# Patient Record
Sex: FEMALE | Race: WHITE | NOT HISPANIC OR LATINO | Employment: FULL TIME | URBAN - METROPOLITAN AREA
[De-identification: names, ages, dates, MRNs, and addresses within clinical notes are randomized per-mention and may not be internally consistent; named-entity substitution may affect disease eponyms.]

---

## 2018-03-23 ENCOUNTER — HISTORICAL (OUTPATIENT)
Dept: LAB | Facility: HOSPITAL | Age: 58
End: 2018-03-23

## 2018-03-23 LAB
ABS NEUT (OLG): 2.77 X10(3)/MCL (ref 2.1–9.2)
BASOPHILS # BLD AUTO: 0.1 X10(3)/MCL (ref 0–0.2)
BASOPHILS NFR BLD AUTO: 1 %
CRP SERPL HS-MCNC: 0.43 MG/L (ref 0–3)
EOSINOPHIL # BLD AUTO: 0.2 X10(3)/MCL (ref 0–0.9)
EOSINOPHIL NFR BLD AUTO: 3 %
ERYTHROCYTE [DISTWIDTH] IN BLOOD BY AUTOMATED COUNT: 13.2 % (ref 11.5–17)
ERYTHROCYTE [SEDIMENTATION RATE] IN BLOOD: 12 MM/HR (ref 0–20)
HCT VFR BLD AUTO: 42.5 % (ref 37–47)
HGB BLD-MCNC: 12.9 GM/DL (ref 12–16)
LYMPHOCYTES # BLD AUTO: 1.7 X10(3)/MCL (ref 0.6–4.6)
LYMPHOCYTES NFR BLD AUTO: 34 %
MCH RBC QN AUTO: 28.5 PG (ref 27–31)
MCHC RBC AUTO-ENTMCNC: 30.4 GM/DL (ref 33–36)
MCV RBC AUTO: 94 FL (ref 80–94)
MONOCYTES # BLD AUTO: 0.3 X10(3)/MCL (ref 0.1–1.3)
MONOCYTES NFR BLD AUTO: 6 %
NEUTROPHILS # BLD AUTO: 2.77 X10(3)/MCL (ref 1.4–7.9)
NEUTROPHILS NFR BLD AUTO: 55 %
PLATELET # BLD AUTO: 194 X10(3)/MCL (ref 130–400)
PMV BLD AUTO: 12.6 FL (ref 9.4–12.4)
RBC # BLD AUTO: 4.52 X10(6)/MCL (ref 4.2–5.4)
RHEUMATOID FACT SERPL-ACNC: <10 IU/ML (ref 0–15)
URATE SERPL-MCNC: 4.6 MG/DL (ref 2.6–7.2)
WBC # SPEC AUTO: 5 X10(3)/MCL (ref 4.5–11.5)

## 2018-06-04 LAB
BUN SERPL-MCNC: 27 MG/DL (ref 7–18)
CREAT SERPL-MCNC: 1.06 MG/DL (ref 0.6–1.3)
GLUCOSE SERPL-MCNC: 105 MG/DL (ref 74–106)

## 2018-12-12 ENCOUNTER — HISTORICAL (OUTPATIENT)
Dept: LAB | Facility: HOSPITAL | Age: 58
End: 2018-12-12

## 2018-12-12 LAB
CHOLEST SERPL-MCNC: 259 MG/DL (ref 0–200)
CHOLEST/HDLC SERPL: 2.5 {RATIO} (ref 0–4)
HDLC SERPL-MCNC: 102 MG/DL (ref 35–60)
LDLC SERPL CALC-MCNC: 143 MG/DL (ref 0–129)
TRIGL SERPL-MCNC: 68 MG/DL (ref 30–150)
VLDLC SERPL CALC-MCNC: 14 MG/DL

## 2019-01-09 ENCOUNTER — HISTORICAL (OUTPATIENT)
Dept: LAB | Facility: HOSPITAL | Age: 59
End: 2019-01-09

## 2019-01-09 LAB
ABS NEUT (OLG): 2.97 X10(3)/MCL (ref 2.1–9.2)
ALBUMIN SERPL-MCNC: 3.8 GM/DL (ref 3.4–5)
ALBUMIN/GLOB SERPL: 1 {RATIO}
ALP SERPL-CCNC: 58 UNIT/L (ref 38–126)
ALT SERPL-CCNC: 52 UNIT/L (ref 12–78)
APPEARANCE, UA: CLEAR
AST SERPL-CCNC: 31 UNIT/L (ref 15–37)
BACTERIA SPEC CULT: ABNORMAL /HPF
BASOPHILS # BLD AUTO: 0 X10(3)/MCL (ref 0–0.2)
BASOPHILS NFR BLD AUTO: 1 %
BILIRUB SERPL-MCNC: 0.3 MG/DL (ref 0.2–1)
BILIRUB UR QL STRIP: NEGATIVE
BILIRUBIN DIRECT+TOT PNL SERPL-MCNC: 0.1 MG/DL (ref 0–0.2)
BILIRUBIN DIRECT+TOT PNL SERPL-MCNC: 0.2 MG/DL (ref 0–0.8)
BUN SERPL-MCNC: 27 MG/DL (ref 7–18)
CALCIUM SERPL-MCNC: 9.2 MG/DL (ref 8.5–10.1)
CHLORIDE SERPL-SCNC: 101 MMOL/L (ref 98–107)
CHOLEST SERPL-MCNC: 231 MG/DL (ref 0–200)
CHOLEST/HDLC SERPL: 2.5 {RATIO} (ref 0–4)
CO2 SERPL-SCNC: 30 MMOL/L (ref 21–32)
COLOR UR: YELLOW
CREAT SERPL-MCNC: 0.87 MG/DL (ref 0.55–1.02)
DEPRECATED CALCIDIOL+CALCIFEROL SERPL-MC: 64.6 NG/ML (ref 30–80)
EOSINOPHIL # BLD AUTO: 0.2 X10(3)/MCL (ref 0–0.9)
EOSINOPHIL NFR BLD AUTO: 3 %
ERYTHROCYTE [DISTWIDTH] IN BLOOD BY AUTOMATED COUNT: 12.9 % (ref 11.5–17)
GLOBULIN SER-MCNC: 3.8 GM/DL (ref 2.4–3.5)
GLUCOSE (UA): NEGATIVE
GLUCOSE SERPL-MCNC: 94 MG/DL (ref 74–106)
HCT VFR BLD AUTO: 44.6 % (ref 37–47)
HDLC SERPL-MCNC: 93 MG/DL (ref 35–60)
HGB BLD-MCNC: 13.4 GM/DL (ref 12–16)
HGB UR QL STRIP: ABNORMAL
KETONES UR QL STRIP: NEGATIVE
LDLC SERPL CALC-MCNC: 126 MG/DL (ref 0–129)
LEUKOCYTE ESTERASE UR QL STRIP: ABNORMAL
LYMPHOCYTES # BLD AUTO: 1.4 X10(3)/MCL (ref 0.6–4.6)
LYMPHOCYTES NFR BLD AUTO: 29 %
MCH RBC QN AUTO: 28.3 PG (ref 27–31)
MCHC RBC AUTO-ENTMCNC: 30 GM/DL (ref 33–36)
MCV RBC AUTO: 94.3 FL (ref 80–94)
MONOCYTES # BLD AUTO: 0.3 X10(3)/MCL (ref 0.1–1.3)
MONOCYTES NFR BLD AUTO: 6 %
NEUTROPHILS # BLD AUTO: 2.97 X10(3)/MCL (ref 2.1–9.2)
NEUTROPHILS NFR BLD AUTO: 60 %
NITRITE UR QL STRIP: NEGATIVE
PH UR STRIP: 5.5 [PH] (ref 5–9)
PLATELET # BLD AUTO: 196 X10(3)/MCL (ref 130–400)
PMV BLD AUTO: 13.4 FL (ref 9.4–12.4)
POTASSIUM SERPL-SCNC: 4.3 MMOL/L (ref 3.5–5.1)
PROT SERPL-MCNC: 7.6 GM/DL (ref 6.4–8.2)
PROT UR QL STRIP: NEGATIVE
RBC # BLD AUTO: 4.73 X10(6)/MCL (ref 4.2–5.4)
RBC #/AREA URNS HPF: ABNORMAL /[HPF]
SODIUM SERPL-SCNC: 139 MMOL/L (ref 136–145)
SP GR UR STRIP: 1.02 (ref 1–1.03)
SQUAMOUS EPITHELIAL, UA: ABNORMAL
TRIGL SERPL-MCNC: 62 MG/DL (ref 30–150)
TSH SERPL-ACNC: 3.33 MIU/L (ref 0.36–3.74)
UROBILINOGEN UR STRIP-ACNC: 0.2
VLDLC SERPL CALC-MCNC: 12 MG/DL
WBC # SPEC AUTO: 4.9 X10(3)/MCL (ref 4.5–11.5)
WBC #/AREA URNS HPF: ABNORMAL /[HPF]

## 2019-09-30 ENCOUNTER — HISTORICAL (OUTPATIENT)
Dept: LAB | Facility: HOSPITAL | Age: 59
End: 2019-09-30

## 2019-09-30 LAB
ABS NEUT (OLG): 2.85 X10(3)/MCL (ref 2.1–9.2)
ALBUMIN SERPL-MCNC: 4.2 GM/DL (ref 3.4–5)
ALBUMIN/GLOB SERPL: 1.2 RATIO (ref 1.1–2)
ALP SERPL-CCNC: 59 UNIT/L (ref 38–126)
ALT SERPL-CCNC: 45 UNIT/L (ref 12–78)
AST SERPL-CCNC: 25 UNIT/L (ref 15–37)
BASOPHILS # BLD AUTO: 0.1 X10(3)/MCL (ref 0–0.2)
BASOPHILS NFR BLD AUTO: 1 %
BILIRUB SERPL-MCNC: 0.4 MG/DL (ref 0.2–1)
BILIRUBIN DIRECT+TOT PNL SERPL-MCNC: 0.1 MG/DL (ref 0–0.5)
BILIRUBIN DIRECT+TOT PNL SERPL-MCNC: 0.3 MG/DL (ref 0–0.8)
BUN SERPL-MCNC: 27 MG/DL (ref 7–18)
CALCIUM SERPL-MCNC: 9.3 MG/DL (ref 8.5–10.1)
CHLORIDE SERPL-SCNC: 105 MMOL/L (ref 98–107)
CHOLEST SERPL-MCNC: 237 MG/DL (ref 0–200)
CHOLEST/HDLC SERPL: 2.5 {RATIO} (ref 0–4)
CO2 SERPL-SCNC: 32 MMOL/L (ref 21–32)
CREAT SERPL-MCNC: 0.86 MG/DL (ref 0.55–1.02)
EOSINOPHIL # BLD AUTO: 0.2 X10(3)/MCL (ref 0–0.9)
EOSINOPHIL NFR BLD AUTO: 4 %
ERYTHROCYTE [DISTWIDTH] IN BLOOD BY AUTOMATED COUNT: 13.4 % (ref 11.5–17)
GLOBULIN SER-MCNC: 3.6 GM/DL (ref 2.4–3.5)
GLUCOSE SERPL-MCNC: 101 MG/DL (ref 74–106)
HCT VFR BLD AUTO: 43.4 % (ref 37–47)
HDLC SERPL-MCNC: 94 MG/DL (ref 35–60)
HGB BLD-MCNC: 13.3 GM/DL (ref 12–16)
LDLC SERPL CALC-MCNC: 131 MG/DL (ref 0–129)
LYMPHOCYTES # BLD AUTO: 1.5 X10(3)/MCL (ref 0.6–4.6)
LYMPHOCYTES NFR BLD AUTO: 30 %
MCH RBC QN AUTO: 28.7 PG (ref 27–31)
MCHC RBC AUTO-ENTMCNC: 30.6 GM/DL (ref 33–36)
MCV RBC AUTO: 93.7 FL (ref 80–94)
MONOCYTES # BLD AUTO: 0.4 X10(3)/MCL (ref 0.1–1.3)
MONOCYTES NFR BLD AUTO: 7 %
NEUTROPHILS # BLD AUTO: 2.85 X10(3)/MCL (ref 2.1–9.2)
NEUTROPHILS NFR BLD AUTO: 57 %
PLATELET # BLD AUTO: 154 X10(3)/MCL (ref 130–400)
PMV BLD AUTO: 14.1 FL (ref 9.4–12.4)
POTASSIUM SERPL-SCNC: 4.5 MMOL/L (ref 3.5–5.1)
PROT SERPL-MCNC: 7.8 GM/DL (ref 6.4–8.2)
RBC # BLD AUTO: 4.63 X10(6)/MCL (ref 4.2–5.4)
SODIUM SERPL-SCNC: 142 MMOL/L (ref 136–145)
TRIGL SERPL-MCNC: 59 MG/DL (ref 30–150)
TSH SERPL-ACNC: 4.19 MIU/L (ref 0.36–3.74)
VLDLC SERPL CALC-MCNC: 12 MG/DL
WBC # SPEC AUTO: 5 X10(3)/MCL (ref 4.5–11.5)

## 2020-02-07 ENCOUNTER — HISTORICAL (OUTPATIENT)
Dept: RADIOLOGY | Facility: HOSPITAL | Age: 60
End: 2020-02-07

## 2020-02-07 LAB
BUN SERPL-MCNC: 38 MG/DL (ref 7–18)
CALCIUM SERPL-MCNC: 9.9 MG/DL (ref 8.5–10.1)
CHLORIDE SERPL-SCNC: 102 MMOL/L (ref 98–107)
CO2 SERPL-SCNC: 32 MMOL/L (ref 21–32)
CREAT SERPL-MCNC: 1 MG/DL (ref 0.6–1.3)
CREAT/UREA NIT SERPL: 38
CRP SERPL-MCNC: <0.3 MG/DL
ERYTHROCYTE [SEDIMENTATION RATE] IN BLOOD: 20 MM/HR (ref 0–20)
GLUCOSE SERPL-MCNC: 102 MG/DL (ref 74–106)
POTASSIUM SERPL-SCNC: 4.8 MMOL/L (ref 3.5–5.1)
SODIUM SERPL-SCNC: 137 MMOL/L (ref 136–145)
T PALLIDUM AB SER QL: NONREACTIVE

## 2020-03-27 ENCOUNTER — HISTORICAL (OUTPATIENT)
Dept: ADMINISTRATIVE | Facility: HOSPITAL | Age: 60
End: 2020-03-27

## 2020-03-27 LAB
ABS NEUT (OLG): 4.53 X10(3)/MCL (ref 2.1–9.2)
ALBUMIN SERPL-MCNC: 3.9 GM/DL (ref 3.4–5)
ALBUMIN/GLOB SERPL: 1 RATIO (ref 1.1–2)
ALP SERPL-CCNC: 61 UNIT/L (ref 45–117)
ALT SERPL-CCNC: 28 UNIT/L (ref 12–78)
AST SERPL-CCNC: 21 UNIT/L (ref 15–37)
BASOPHILS # BLD AUTO: 0 X10(3)/MCL (ref 0–0.2)
BASOPHILS NFR BLD AUTO: 1 %
BILIRUB SERPL-MCNC: 0.3 MG/DL (ref 0.2–1)
BILIRUBIN DIRECT+TOT PNL SERPL-MCNC: 0.1 MG/DL (ref 0–0.2)
BILIRUBIN DIRECT+TOT PNL SERPL-MCNC: 0.2 MG/DL
BUN SERPL-MCNC: 30 MG/DL (ref 7–18)
CALCIUM SERPL-MCNC: 9.5 MG/DL (ref 8.5–10.1)
CHLORIDE SERPL-SCNC: 105 MMOL/L (ref 98–107)
CHOLEST SERPL-MCNC: 224 MG/DL
CHOLEST/HDLC SERPL: 2.5 {RATIO} (ref 0–4.4)
CO2 SERPL-SCNC: 30 MMOL/L (ref 21–32)
CREAT SERPL-MCNC: 0.9 MG/DL (ref 0.6–1.3)
DEPRECATED CALCIDIOL+CALCIFEROL SERPL-MC: 36.1 NG/ML (ref 30–80)
EOSINOPHIL # BLD AUTO: 0.2 X10(3)/MCL (ref 0–0.9)
EOSINOPHIL NFR BLD AUTO: 2 %
ERYTHROCYTE [DISTWIDTH] IN BLOOD BY AUTOMATED COUNT: 12.9 % (ref 11.5–14.5)
FOLATE SERPL-MCNC: 15.3 NG/ML (ref 3.1–17.5)
GLOBULIN SER-MCNC: 4.1 GM/ML (ref 2.3–3.5)
GLUCOSE SERPL-MCNC: 103 MG/DL (ref 74–106)
HCT VFR BLD AUTO: 40.9 % (ref 35–46)
HDLC SERPL-MCNC: 88 MG/DL (ref 40–59)
HGB BLD-MCNC: 12.9 GM/DL (ref 12–16)
IMM GRANULOCYTES # BLD AUTO: 0.02 10*3/UL
IMM GRANULOCYTES NFR BLD AUTO: 0 %
LDLC SERPL CALC-MCNC: 123 MG/DL
LYMPHOCYTES # BLD AUTO: 1.4 X10(3)/MCL (ref 0.6–4.6)
LYMPHOCYTES NFR BLD AUTO: 21 %
MCH RBC QN AUTO: 28.6 PG (ref 26–34)
MCHC RBC AUTO-ENTMCNC: 31.5 GM/DL (ref 31–37)
MCV RBC AUTO: 90.7 FL (ref 80–100)
MONOCYTES # BLD AUTO: 0.4 X10(3)/MCL (ref 0.1–1.3)
MONOCYTES NFR BLD AUTO: 7 %
NEUTROPHILS # BLD AUTO: 4.53 X10(3)/MCL (ref 2.1–9.2)
NEUTROPHILS NFR BLD AUTO: 69 %
PLATELET # BLD AUTO: 174 X10(3)/MCL (ref 130–400)
PMV BLD AUTO: 12.5 FL (ref 7.4–10.4)
POTASSIUM SERPL-SCNC: 4.8 MMOL/L (ref 3.5–5.1)
PROT SERPL-MCNC: 8 GM/DL (ref 6.4–8.2)
RBC # BLD AUTO: 4.51 X10(6)/MCL (ref 4–5.2)
SODIUM SERPL-SCNC: 140 MMOL/L (ref 136–145)
T3FREE SERPL-MCNC: 2.31 PG/ML (ref 2.18–3.98)
T4 FREE SERPL-MCNC: 1.21 NG/DL (ref 0.76–1.46)
TRIGL SERPL-MCNC: 64 MG/DL
TSH SERPL-ACNC: 2.31 MIU/L (ref 0.36–3.74)
VIT B12 SERPL-MCNC: 291 PG/ML (ref 193–986)
VLDLC SERPL CALC-MCNC: 13 MG/DL
WBC # SPEC AUTO: 6.6 X10(3)/MCL (ref 4.5–11)

## 2020-05-15 ENCOUNTER — HISTORICAL (OUTPATIENT)
Dept: LAB | Facility: HOSPITAL | Age: 60
End: 2020-05-15

## 2020-05-15 LAB
T4 FREE SERPL-MCNC: 1.22 NG/DL (ref 0.76–1.46)
TSH SERPL-ACNC: 1.96 MIU/L (ref 0.36–3.74)
VIT B12 SERPL-MCNC: 912 PG/ML (ref 193–986)

## 2021-01-27 ENCOUNTER — HISTORICAL (OUTPATIENT)
Dept: LAB | Facility: HOSPITAL | Age: 61
End: 2021-01-27

## 2021-01-27 LAB
ABS NEUT (OLG): 2.45 X10(3)/MCL (ref 2.1–9.2)
ALBUMIN SERPL-MCNC: 4.1 GM/DL (ref 3.5–5)
ALBUMIN/GLOB SERPL: 1.1 RATIO (ref 1.1–2)
ALP SERPL-CCNC: 63 UNIT/L (ref 40–150)
ALT SERPL-CCNC: 37 UNIT/L (ref 0–55)
AST SERPL-CCNC: 24 UNIT/L (ref 5–34)
BASOPHILS # BLD AUTO: 0.1 X10(3)/MCL (ref 0–0.2)
BASOPHILS NFR BLD AUTO: 1 %
BILIRUB SERPL-MCNC: <0.5 MG/DL
BILIRUBIN DIRECT+TOT PNL SERPL-MCNC: 0.2 MG/DL (ref 0–0.5)
BILIRUBIN DIRECT+TOT PNL SERPL-MCNC: <0.3 MG/DL (ref 0–0.8)
BUN SERPL-MCNC: 28 MG/DL (ref 9.8–20.1)
CALCIUM SERPL-MCNC: 10.2 MG/DL (ref 8.4–10.2)
CHLORIDE SERPL-SCNC: 100 MMOL/L (ref 98–107)
CO2 SERPL-SCNC: 32 MMOL/L (ref 22–29)
CREAT SERPL-MCNC: 0.84 MG/DL (ref 0.55–1.02)
EOSINOPHIL # BLD AUTO: 0.2 X10(3)/MCL (ref 0–0.9)
EOSINOPHIL NFR BLD AUTO: 4 %
ERYTHROCYTE [DISTWIDTH] IN BLOOD BY AUTOMATED COUNT: 12.9 % (ref 11.5–14.5)
ERYTHROCYTE [SEDIMENTATION RATE] IN BLOOD: 18 MM/HR (ref 0–20)
GLOBULIN SER-MCNC: 3.6 GM/DL (ref 2.4–3.5)
GLUCOSE SERPL-MCNC: 85 MG/DL (ref 74–100)
HCT VFR BLD AUTO: 40.4 % (ref 35–46)
HGB BLD-MCNC: 13 GM/DL (ref 12–16)
IMM GRANULOCYTES # BLD AUTO: 0.02 10*3/UL
IMM GRANULOCYTES NFR BLD AUTO: 0 %
LYMPHOCYTES # BLD AUTO: 1.9 X10(3)/MCL (ref 0.6–4.6)
LYMPHOCYTES NFR BLD AUTO: 38 %
MCH RBC QN AUTO: 28.8 PG (ref 26–34)
MCHC RBC AUTO-ENTMCNC: 32.2 GM/DL (ref 31–37)
MCV RBC AUTO: 89.4 FL (ref 80–100)
MONOCYTES # BLD AUTO: 0.3 X10(3)/MCL (ref 0.1–1.3)
MONOCYTES NFR BLD AUTO: 6 %
NEUTROPHILS # BLD AUTO: 2.45 X10(3)/MCL (ref 2.1–9.2)
NEUTROPHILS NFR BLD AUTO: 49 %
PLATELET # BLD AUTO: 196 X10(3)/MCL (ref 130–400)
PMV BLD AUTO: 12.9 FL (ref 7.4–10.4)
POTASSIUM SERPL-SCNC: 3.8 MMOL/L (ref 3.5–5.1)
PROT SERPL-MCNC: 7.7 GM/DL (ref 6.4–8.3)
RBC # BLD AUTO: 4.52 X10(6)/MCL (ref 4–5.2)
SODIUM SERPL-SCNC: 142 MMOL/L (ref 136–145)
TSH SERPL-ACNC: 1.49 UIU/ML (ref 0.35–4.94)
WBC # SPEC AUTO: 5 X10(3)/MCL (ref 4.5–11)

## 2021-02-04 ENCOUNTER — HISTORICAL (OUTPATIENT)
Dept: ADMINISTRATIVE | Facility: HOSPITAL | Age: 61
End: 2021-02-04

## 2021-02-04 LAB
ABS NEUT (OLG): 3.83 X10(3)/MCL (ref 2.1–9.2)
ALBUMIN SERPL-MCNC: 4 GM/DL (ref 3.5–5)
ALBUMIN/GLOB SERPL: 1.5 RATIO (ref 1.1–2)
ALP SERPL-CCNC: 64 UNIT/L (ref 40–150)
ALT SERPL-CCNC: 27 UNIT/L (ref 0–55)
AST SERPL-CCNC: 18 UNIT/L (ref 5–34)
BASOPHILS # BLD AUTO: 0 X10(3)/MCL (ref 0–0.2)
BASOPHILS NFR BLD AUTO: 1 %
BILIRUB SERPL-MCNC: 0.2 MG/DL
BILIRUBIN DIRECT+TOT PNL SERPL-MCNC: 0.1 MG/DL (ref 0–0.5)
BILIRUBIN DIRECT+TOT PNL SERPL-MCNC: 0.1 MG/DL (ref 0–0.8)
BUN SERPL-MCNC: 25.8 MG/DL (ref 9.8–20.1)
CALCIUM SERPL-MCNC: 9 MG/DL (ref 8.4–10.2)
CHLORIDE SERPL-SCNC: 102 MMOL/L (ref 98–107)
CHOLEST SERPL-MCNC: 222 MG/DL
CHOLEST/HDLC SERPL: 3 {RATIO} (ref 0–5)
CO2 SERPL-SCNC: 30 MMOL/L (ref 22–29)
CREAT SERPL-MCNC: 0.93 MG/DL (ref 0.55–1.02)
DEPRECATED CALCIDIOL+CALCIFEROL SERPL-MC: 39.7 NG/ML (ref 30–80)
EOSINOPHIL # BLD AUTO: 0.4 X10(3)/MCL (ref 0–0.9)
EOSINOPHIL NFR BLD AUTO: 6 %
ERYTHROCYTE [DISTWIDTH] IN BLOOD BY AUTOMATED COUNT: 13.9 % (ref 11.5–17)
GLOBULIN SER-MCNC: 2.6 GM/DL (ref 2.4–3.5)
GLUCOSE SERPL-MCNC: 94 MG/DL (ref 74–100)
HCT VFR BLD AUTO: 43 % (ref 37–47)
HDLC SERPL-MCNC: 73 MG/DL (ref 35–60)
HGB BLD-MCNC: 12.9 GM/DL (ref 12–16)
LDLC SERPL CALC-MCNC: 136 MG/DL (ref 50–140)
LYMPHOCYTES # BLD AUTO: 1.3 X10(3)/MCL (ref 0.6–4.6)
LYMPHOCYTES NFR BLD AUTO: 22 %
MCH RBC QN AUTO: 28.7 PG (ref 27–31)
MCHC RBC AUTO-ENTMCNC: 30 GM/DL (ref 33–36)
MCV RBC AUTO: 95.8 FL (ref 80–94)
MONOCYTES # BLD AUTO: 0.4 X10(3)/MCL (ref 0.1–1.3)
MONOCYTES NFR BLD AUTO: 7 %
NEUTROPHILS # BLD AUTO: 3.83 X10(3)/MCL (ref 2.1–9.2)
NEUTROPHILS NFR BLD AUTO: 64 %
PLATELET # BLD AUTO: 198 X10(3)/MCL (ref 130–400)
PMV BLD AUTO: 13.2 FL (ref 9.4–12.4)
POTASSIUM SERPL-SCNC: 4.7 MMOL/L (ref 3.5–5.1)
PROT SERPL-MCNC: 6.6 GM/DL (ref 6.4–8.3)
RBC # BLD AUTO: 4.49 X10(6)/MCL (ref 4.2–5.4)
SODIUM SERPL-SCNC: 143 MMOL/L (ref 136–145)
TRIGL SERPL-MCNC: 65 MG/DL (ref 37–140)
TSH SERPL-ACNC: 2.8 UIU/ML (ref 0.35–4.94)
VLDLC SERPL CALC-MCNC: 13 MG/DL
WBC # SPEC AUTO: 6 X10(3)/MCL (ref 4.5–11.5)

## 2021-02-11 LAB
HUMAN PAPILLOMAVIRUS (HPV): NORMAL
PAP RECOMMENDATION EXT: NORMAL
PAP SMEAR: NORMAL

## 2021-06-22 ENCOUNTER — HISTORICAL (OUTPATIENT)
Dept: ADMINISTRATIVE | Facility: HOSPITAL | Age: 61
End: 2021-06-22

## 2021-06-22 LAB — VIT B12 SERPL-MCNC: 533 PG/ML (ref 213–816)

## 2021-06-29 LAB — BCS RECOMMENDATION EXT: NORMAL

## 2021-07-19 ENCOUNTER — HISTORICAL (OUTPATIENT)
Dept: ADMINISTRATIVE | Facility: HOSPITAL | Age: 61
End: 2021-07-19

## 2021-09-23 ENCOUNTER — HISTORICAL (OUTPATIENT)
Dept: LAB | Facility: HOSPITAL | Age: 61
End: 2021-09-23

## 2021-09-23 LAB
C3 SERPL-MCNC: 111 MG/DL (ref 80–173)
C4 SERPL-MCNC: 38.6 MG/DL (ref 13–46)

## 2021-11-05 ENCOUNTER — HISTORICAL (OUTPATIENT)
Dept: LAB | Facility: HOSPITAL | Age: 61
End: 2021-11-05

## 2021-11-05 LAB
CHOLEST SERPL-MCNC: 207 MG/DL
CHOLEST/HDLC SERPL: 3 {RATIO} (ref 0–5)
HDLC SERPL-MCNC: 71 MG/DL (ref 35–60)
LDLC SERPL CALC-MCNC: 127 MG/DL (ref 50–140)
TRIGL SERPL-MCNC: 47 MG/DL (ref 37–140)
VLDLC SERPL CALC-MCNC: 9 MG/DL

## 2022-02-25 ENCOUNTER — HISTORICAL (OUTPATIENT)
Dept: LAB | Facility: HOSPITAL | Age: 62
End: 2022-02-25

## 2022-02-25 LAB
BUN SERPL-MCNC: 20.8 MG/DL (ref 9.8–20.1)
CALCIUM SERPL-MCNC: 9.8 MG/DL (ref 8.7–10.5)
CHLORIDE SERPL-SCNC: 99 MMOL/L (ref 98–107)
CHOLEST SERPL-MCNC: 228 MG/DL
CHOLEST/HDLC SERPL: 3 {RATIO} (ref 0–5)
CO2 SERPL-SCNC: 30 MMOL/L (ref 23–31)
CREAT SERPL-MCNC: 0.86 MG/DL (ref 0.55–1.02)
CREAT/UREA NIT SERPL: 24
GLUCOSE SERPL-MCNC: 107 MG/DL (ref 82–115)
HDLC SERPL-MCNC: 72 MG/DL (ref 35–60)
HEMOLYSIS INTERF INDEX SERPL-ACNC: 8
ICTERIC INTERF INDEX SERPL-ACNC: 0
LDLC SERPL CALC-MCNC: 144 MG/DL (ref 50–140)
LIPEMIC INTERF INDEX SERPL-ACNC: 3
POTASSIUM SERPL-SCNC: 4.5 MMOL/L (ref 3.5–5.1)
SODIUM SERPL-SCNC: 135 MMOL/L (ref 136–145)
TRIGL SERPL-MCNC: 60 MG/DL (ref 37–140)
VLDLC SERPL CALC-MCNC: 12 MG/DL

## 2022-03-07 ENCOUNTER — HISTORICAL (OUTPATIENT)
Dept: LAB | Facility: HOSPITAL | Age: 62
End: 2022-03-07

## 2022-03-07 LAB
ABS NEUT (OLG): 3.64 (ref 2.1–9.2)
ALBUMIN SERPL-MCNC: 4.1 G/DL (ref 3.4–4.8)
ALBUMIN/GLOB SERPL: 1 {RATIO} (ref 1.1–2)
ALP SERPL-CCNC: 80 U/L (ref 40–150)
ALT SERPL-CCNC: 30 U/L (ref 0–55)
APPEARANCE, UA: CLEAR
AST SERPL-CCNC: 24 U/L (ref 5–34)
BACTERIA SPEC CULT: NORMAL
BASOPHILS # BLD AUTO: 0.06 10*3/UL (ref 0–0.2)
BASOPHILS NFR BLD AUTO: 1.1 % (ref 0–1)
BILIRUB SERPL-MCNC: 0.5 MG/DL (ref 0.2–1.2)
BILIRUB UR QL STRIP.AUTO: NEGATIVE
BILIRUB UR QL STRIP: NEGATIVE
BILIRUBIN DIRECT+TOT PNL SERPL-MCNC: 0.2 (ref 0–0.5)
BILIRUBIN DIRECT+TOT PNL SERPL-MCNC: 0.3 (ref 0–0.8)
BUN SERPL-MCNC: 16.9 MG/DL (ref 9.8–20.1)
CALCIUM SERPL-MCNC: 10.3 MG/DL (ref 8.4–10.2)
CHLORIDE SERPL-SCNC: 99 MMOL/L (ref 98–107)
CO2 SERPL-SCNC: 30 MMOL/L (ref 23–31)
COLOR UR: NORMAL
CREAT SERPL-MCNC: 0.84 MG/DL (ref 0.57–1.11)
DEPRECATED CALCIDIOL+CALCIFEROL SERPL-MC: 47.7 NG/ML (ref 30–80)
DO MICRO?: YES
EOSINOPHIL # BLD AUTO: 0.16 10*3/UL (ref 0–0.9)
EOSINOPHIL NFR BLD AUTO: 2.9 % (ref 0–6.4)
ERYTHROCYTE [DISTWIDTH] IN BLOOD BY AUTOMATED COUNT: 12.8 % (ref 11.5–17)
EST. AVERAGE GLUCOSE BLD GHB EST-MCNC: 108.3 MG/DL
GLOBULIN SER-MCNC: 4.1 G/DL (ref 2.4–3.5)
GLUCOSE (UA): NEGATIVE
GLUCOSE SERPL-MCNC: 101 MG/DL (ref 82–115)
GLUCOSE UR QL STRIP.AUTO: NEGATIVE
HBA1C MFR BLD: 5.4 %
HCT VFR BLD AUTO: 43 % (ref 37–47)
HEMOLYSIS INTERF INDEX SERPL-ACNC: 4
HGB BLD-MCNC: 13.6 G/DL (ref 12–16)
HGB UR QL STRIP: NEGATIVE
ICTERIC INTERF INDEX SERPL-ACNC: 0
IMM GRANULOCYTES # BLD AUTO: 0.02 10*3/UL (ref 0–0.02)
IMM GRANULOCYTES NFR BLD AUTO: 0.4 % (ref 0–0.43)
KETONES UR QL STRIP.AUTO: NEGATIVE
KETONES UR QL STRIP: NEGATIVE
LEUKOCYTE ESTERASE UR QL STRIP.AUTO: NORMAL
LEUKOCYTE ESTERASE UR QL STRIP: NORMAL
LIPEMIC INTERF INDEX SERPL-ACNC: 2
LYMPHOCYTES # BLD AUTO: 1.34 10*3/UL (ref 0.6–4.6)
LYMPHOCYTES NFR BLD AUTO: 24 % (ref 16–44)
MANUAL DIFF? (OHS): NO
MCH RBC QN AUTO: 28.7 PG (ref 27–31)
MCHC RBC AUTO-ENTMCNC: 31.6 G/DL (ref 33–36)
MCV RBC AUTO: 90.7 FL (ref 80–94)
MONOCYTES # BLD AUTO: 0.36 10*3/UL (ref 0.1–1.3)
MONOCYTES NFR BLD AUTO: 6.5 % (ref 4–12.1)
NEUTROPHILS # BLD AUTO: 3.64 10*3/UL (ref 2.1–9.2)
NEUTROPHILS NFR BLD AUTO: 65.1 % (ref 43–73)
NITRITE UR QL STRIP: NEGATIVE
NRBC BLD AUTO-RTO: 0.4 % (ref 0–0.2)
PH UR STRIP: 7 [PH] (ref 5–7)
PLATELET # BLD AUTO: 234 10*3/UL (ref 130–400)
PMV BLD AUTO: 12 FL (ref 7.4–10.4)
POTASSIUM SERPL-SCNC: 4.1 MMOL/L (ref 3.5–5.1)
PROT SERPL-MCNC: 8.2 G/DL (ref 5.8–7.6)
PROT UR QL STRIP.AUTO: NEGATIVE
PROT UR QL STRIP: NEGATIVE
RBC # BLD AUTO: 4.74 10*6/UL (ref 4.2–5.4)
RBC #/AREA URNS HPF: 0 /[HPF] (ref 2–5)
RBC UR QL AUTO: NEGATIVE
SODIUM SERPL-SCNC: 139 MMOL/L (ref 136–145)
SP GR UR STRIP: 1.01 (ref 1–1.03)
SQUAMOUS EPITHELIAL, UA: NORMAL
TSH SERPL-ACNC: 1.05 M[IU]/L (ref 0.35–4.94)
UROBILINOGEN UR STRIP-ACNC: 0.2
UROBILINOGEN UR STRIP-ACNC: NEGATIVE
WBC # SPEC AUTO: 5.6 10*3/UL (ref 4.5–11.5)
WBC #/AREA URNS HPF: NORMAL /[HPF] (ref 2–5)

## 2022-03-21 LAB — NONINV COLON CA DNA+OCC BLD SCRN STL QL: NEGATIVE

## 2022-04-09 ENCOUNTER — HISTORICAL (OUTPATIENT)
Dept: ADMINISTRATIVE | Facility: HOSPITAL | Age: 62
End: 2022-04-09
Payer: COMMERCIAL

## 2022-04-21 ENCOUNTER — HISTORICAL (OUTPATIENT)
Dept: LAB | Facility: HOSPITAL | Age: 62
End: 2022-04-21
Payer: COMMERCIAL

## 2022-04-21 LAB
CALCIUM SERPL-MCNC: 9.8 MG/DL (ref 8.7–10.5)
CHOLEST SERPL-MCNC: 202 MG/DL
CHOLEST/HDLC SERPL: 3 {RATIO} (ref 0–5)
HDLC SERPL-MCNC: 65 MG/DL (ref 35–60)
HEMOLYSIS INTERF INDEX SERPL-ACNC: 16
ICTERIC INTERF INDEX SERPL-ACNC: 1
LDLC SERPL CALC-MCNC: 118 MG/DL (ref 50–140)
PTH-INTACT SERPL-MCNC: 67.3 PG/ML (ref 8.7–77)
TRIGL SERPL-MCNC: 93 MG/DL (ref 37–140)
VLDLC SERPL CALC-MCNC: 19 MG/DL

## 2022-04-29 VITALS
HEIGHT: 63 IN | BODY MASS INDEX: 24.61 KG/M2 | SYSTOLIC BLOOD PRESSURE: 137 MMHG | OXYGEN SATURATION: 99 % | HEIGHT: 63 IN | WEIGHT: 138.88 LBS | WEIGHT: 134.5 LBS | BODY MASS INDEX: 23.83 KG/M2 | DIASTOLIC BLOOD PRESSURE: 86 MMHG

## 2022-05-02 NOTE — HISTORICAL [facility]ERNER
This is a historical note converted from Yasmine. Formatting and pictures may have been removed.  Please reference Yasmine for original formatting and attached multimedia. Chief Complaint  f/u headache,states it has improved  History of Present Illness  This is a 60 yo Right Handed F with hx of tobacco use, Hypothyroidism, Posterior Vitreous Detachment OU, Vitamin D deficiency, who is referred from Ophthalmology clinic for OU optic nerve hyperemia on OCT likely secondary to Vitamin B12 deficiency. Optic nerve hyperemia has resolved. Still having headaches but only want to take supplements. Patient was last seen on 12/16/2020.  ?  Age of Onset - around time of menstruation  ?  Semiology - right frontotemporal stabbing pain, lasting?1 hr, w/ nausea, w/ photophobia  ?  Frequency -?1 headache days/month  ?  Exacerbating Factors - denied  ?  Risk Factors -  - Family history of headache disorder? denied  - History of Head Trauma? denied  - History of CNS infection? denied  - History of underlying mood disorder? denied  - Illicit drug use? denied  - Tobacco use? states that she had quit 15 year ago  - Alcohol use? denied  - History of sleep disorder? denied  ?  Workup -  - MRI Brain +/- Bob 2/7/2020- I have reviewed the study independently and with the patient. Unremarkable MRI Brain.  ?  - MRI Orbit +/- Bob 2/7/2020 - I have reviewed the study independently and with the patient. Unremarkable MRI Orbit.  ?  - DFE 1/17/2020:  Vitreous: wnl OU  Optic disc: CDR 0.1 OU, P/S/H OU  Macula: flat OU  Vessels: WNL OU  Periphery: flat 360 OU; no H/T/D  ?  - DFE 3/4/2020  Vitreous: wnl OU  Optic disc: CDR 0.05 OU, P/S/H OU  Macula: flat OU  Vessels: WNL OU  Periphery: flat 360 OU; no H/T/D  ?  - DFE 11/18/2020  Vitreous: wnl OU  Optic disc: CDR 0.05 OU, P/S/H, no edema OU  Macula: flat OU  Vessels: WNL OU  Periphery: flat 360 OU; no H/T/D  ?  - OCT RNFL 1/17/2020: 97//104  - OCT RNFL 11/18/2020: 96//98, [place] OU  ?  - B-scan  3/2020: wide optic nerve shadow without definite optic nerve head drusen OU, no RD, or mass OU  ?  - HVF 3/4/2020: full OS, unreliable but mostly full OD.  ?  - ESR/CRP 2/7/2020 - 20 / < 0.3  - Syphilis Ab 2/7/2020 - negative  - TSH 9/30/2019 - 4.190  - Vitamin B12 level 3/27/2020 - 291  - Vitamin B12 level 5/15/2020 - 921  - Vitamin B1 Level 3/27/2020 - 94.1  - Folate Level 3/27/2020 - 15.3  - AQP-4 Ab ([facility]) 3/27/2020 - negative  - Paraneoplastic Panel ([facility]) 3/27/2020 - negative  - ACE level 3/27/2020 - 3  - C3/4 LV 3/27/2020 - 109/47  - RNP Ab [facility] 3/27/2020 - negative  - CASTRO Ab [facility] 3/27/2020 - negative  - SSb and SSa Ab [facility] 3/27/2020 - negative  - Anti Nicholas Ab [facility] 3/27/2020- negative  - DNA Ab DS 3/27/2020 - negative  - MPO Ab 3/27/2020 - negative  ?  ?  Current ppx meds - non  ?  Current abortive meds -  Ibuprofen - partially effective  Magnesium Oxide 250mg BID and Riboflavin 400mg daily  ?  Prior ppx meds -  Denied  ?  Prior abortive meds - denied  ?  Review of Systems  Constitutional: no fever, fatigue, + weakness  Eye: + vision loss, eye redness, drainage, or pain  ENMT: no sore throat, ear pain, + sinus pain/congestion, nasal congestion/drainage  Respiratory: no cough, no wheezing, no shortness of breath  Cardiovascular: no chest pain, no palpitations, no edema  Gastrointestinal: + nausea, vomiting, or diarrhea. No abdominal pain  Genitourinary: no dysuria, no urinary frequency or urgency, no hematuria  Hema/Lymph: no abnormal bruising or bleeding  Endocrine: no heat or cold intolerance, no excessive thirst or excessive urination  Musculoskeletal: no muscle or joint pain, no joint swelling  Integumentary: no skin rash or abnormal lesion  Psychiatric: no depressed mood, + anxiety, no visual/auditory hallucinations, no delusions, no suicidal or homicidal ideation  Neurologic: as per HPI  ?  Physical Exam  Vitals & Measurements  T:?37.1? ?C (Oral)? HR:?69(Peripheral)? RR:?16? BP:?108/70? SpO2:?97%?  HT:?160.02?cm? WT:?63.000?kg?  Eye: clear conjunctiva, eyelids normal  HENT: TMs/ear canals clear, oropharynx without erythema/exudate, oropharynx and nasal mucosal surfaces moist, no maxillary/frontal sinus tenderness to palpation  Neck: full range of motion, no thyromegaly or lymphadenopathy  Respiratory: clear to auscultation bilaterally  Cardiovascular: regular rate and rhythm without murmurs, gallops or rubs  Gastrointestinal: soft, non-tender, non-distended with normal bowel sounds, without masses to palpation  Genitourinary: no CVA tenderness to palpation  Musculoskeletal: full range of motion of all extremities/spine without limitation or discomfort  Integumentary: no rashes or skin lesions present  Neurologic:  Mental Status- Alert and Oriented to time, self, place, Speech is fluent, with intact repetition, naming, reading, and comprehension., No Dysarthria.  CN II-XII - CN I Deferred, SARA, Fundus sharp, non-pallor, no RAPD, VA grossly normal to finger counting at 6ft, No ptosis b/l, EOMI w/o nystagmus, LT/PP symmetric, intact in CN V1-V3 distribution b/l, masseter symmetric b/l, T/U midline, Shoulder shrug symmetric b/l, Hearing grossly intact b/l, VFFC, Face Symmetric.  Motor - Tone and Bulk nml throughout, No involuntary movements, 5/5 to confrontation throughout, FFM nml b/l, No pronator drift.  Sensory - LT/PP/Temp/Proprioception/Vibration symmetric intact throughout.  Reflexes - 2+ Throughout, Babinski negative.  Cerebellar Exam - FNF wnl b/l no intention tremor.  Romberg - negative.  Gait - Normal, Toe gait normal, Heel gait wnl, Tandem gait nml.?  ?  Assessment/Plan  1.?Chronic migraine without aura without status migrainosus, not intractable?G43.703  Ordered:  Clinic Follow up, *Est. 12/22/21 3:00:00 CST, Order for future visit, Chronic migraine without aura without status migrainosus, not intractable  Vitamin B12 deficiency, [facility]  Office/Outpatient Visit Level 4 Established  [id number] PC, Chronic migraine without aura without status migrainosus, not intractable  Vitamin B12 deficiency, [facility], 06/22/21 14:55:00 CDT  Vitamin B12 Level, Routine collect, *Est. 06/23/21 3:00:00 CDT, Blood, Order for future visit, *Est. Stop date 06/23/21 3:00:00 CDT, Lab Collect, Chronic migraine without aura without status migrainosus, not intractable  Vitamin B12 deficiency, Print Label By Order Loc...  ?  2.?Vitamin B12 deficiency?E53.8  Ordered:  Clinic Follow up, *Est. 12/22/21 3:00:00 CST, Order for future visit, Chronic migraine without aura without status migrainosus, not intractable  Vitamin B12 deficiency, [facility]  Office/Outpatient Visit Level 4 Established [id number] PC, Chronic migraine without aura without status migrainosus, not intractable  Vitamin B12 deficiency, [facility], 06/22/21 14:55:00 CDT  Vitamin B12 Level, Routine collect, *Est. 06/23/21 3:00:00 CDT, Blood, Order for future visit, *Est. Stop date 06/23/21 3:00:00 CDT, Lab Collect, Chronic migraine without aura without status migrainosus, not intractable  Vitamin B12 deficiency, Print Label By Order Loc...  ?  This is a 60 yo Right Handed F with hx of tobacco use, Hypothyroidism, Posterior Vitreous Detachment OU, Vitamin D deficiency, who is referred from Ophthalmology clinic for OU optic nerve hyperemia on OCT likely secondary to Vitamin B12 deficiency. Optic nerve hyperemia has resolved.?Migraine?has?improved.?  ?  [] Vitamin B12 level  [] Magnesium Oxide 250mg BID and Riboflavin 400mg daily PRN as needed for headache.  ?  rtc six months  ?  Headache education provided - including good sleep hygiene, stress management, medication overuse education provided - using more 3 OTC per week may worsen headaches, High intensity interval training has shown to reduce headache frequency. Low carb, high protein has shown to reduce headache frequency. Patient is instructed to keep headache diary.  ?   Problem List/Past  Medical History  Ongoing  Chronic migraine without aura without status migrainosus, not intractable  Eczema  Hypothyroidism  Posterior vitreous detachment, both eyes  Vitamin B12 deficiency  Vitamin D deficiency  Historical  Hypothyroidism  Optic neuropathy  Procedure/Surgical History  PAPS (02/11/2021)  Mammogram (06/26/2020)  mammogram (02/01/2020)  MRI angiography of head (02.2020)  Mammogram (08/11/2010)   Medications  Benadryl 25 mg oral capsule, 25 mg= 1 cap(s), Oral, At Bedtime  cyanocobalamin 100 mcg oral tablet, 100 mcg= 1 tab(s), Oral, Daily, 4 refills  EPI-pen 1 mg/mL injectable solution, See Instructions, PRN, 1 refills  EPINEPHrine 0.3 mg injectable kit, 0.3 mg, IM, As Directed  ergocalciferol 50,000 intl units (1.25 mg) oral capsule, 93920 IntUnit= 1 cap(s), Oral, q2wk  levothyroxine 88 mcg (0.088 mg) oral tablet  magnesium oxide 250 mg oral tablet, 500 mg= 2 tab(s), Oral, Daily  riboflavin 100 mg oral tablet, 100 mg= 1 tab(s), Oral, Daily  Zyrtec 10 mg oral tablet, 10 mg= 1 tab(s), Oral, Daily  Allergies  Diclofenac Sodium Topical?(Hives, Pruritus)  dexamethasone topical?(Pruritus, Hives)  econazole topical?(Burning)  erythromycin?(abd pain)  liothyronine?(rash)  sulfa drugs?(Hives)  Social History  Abuse/Neglect  No, No, Yes, 06/22/2021  No, 02/11/2021  No, No, Yes, 12/16/2020  No, 11/18/2020  Alcohol  Never, 12/16/2020  Employment/School  Employed, 03/21/2018  Exercise  Exercise duration: 0., 03/21/2018  Home/Environment  Lives with Alone. Living situation: Home/Independent., 01/14/2019  Nutrition/Health  Type of diet: lactose intolerant. Regular, 03/21/2018  Sexual  Sexually active: No. Gender Identity Identifies as female., 01/14/2019  Sexually active: No., 03/21/2018  Substance Use  Never, 12/16/2020  Tobacco  Former smoker, quit more than 30 days ago, No, 06/22/2021  5-9 cigarettes (between 1/4 to 1/2 pack)/day in last 30 days, No, 02/11/2021  5-9 cigarettes (between 1/4 to 1/2 pack)/day in last  30 days, No, 12/16/2020  Family History  Acute myocardial infarction.: Father.  Coronary artery disease: Brother.  Hypertension.: Father and Brother.  Hyperthyroidism.: Mother.  Mitral valve disorder.: Mother.  Osteopenia: Brother.  Stroke: Father.  Immunizations  Vaccine Date Status   COVID-19 MRNA, LNP-S, PF- Pfizer 04/16/2021 Given   COVID-19 MRNA, LNP-S, PF- Pfizer 03/29/2021 Given   Health Maintenance  Health Maintenance  ???Pending?(in the next year)  ??? ??OverDue  ??? ? ? ?Influenza Vaccine due??10/01/20??and every 1??day(s)  ??? ? ? ?Alcohol Misuse Screening due??01/02/21??and every 1??year(s)  ??? ??Due?  ??? ? ? ?Aspirin Therapy for CVD Prevention due??05/18/21??and every 1??year(s)  ??? ? ? ?Tetanus Vaccine due??06/22/21??and every 10??year(s)  ??? ? ? ?Zoster Vaccine due??06/22/21??Unknown Frequency  ??? ??Due In Future?  ??? ? ? ?Obesity Screening not due until??01/01/22??and every 1??year(s)  ??? ? ? ?Colorectal Screening not due until??01/23/22??and every 3??year(s)  ??? ? ? ?Hypertension Management-BMP not due until??02/04/22??and every 1??year(s)  ???Satisfied?(in the past 1 year)  ??? ??Satisfied?  ??? ? ? ?ADL Screening on??06/22/21.??Satisfied by Gigi LPN, Christine B  ??? ? ? ?Blood Pressure Screening on??06/22/21.??Satisfied by Gigi LPN, Christine B  ??? ? ? ?Body Mass Index Check on??06/22/21.??Satisfied by Gigi LPN, Christine B  ??? ? ? ?Breast Cancer Screening on??06/26/20.??Satisfied by Herlinda Nunez  ??? ? ? ?Cervical Cancer Screening on??02/11/21.??Satisfied by Ricky Smith  ??? ? ? ?Depression Screening on??06/22/21.??Satisfied by Christine Yu LPN  ??? ? ? ?Diabetes Screening on??02/04/21.??Satisfied by LeJeune, Stephanie A.  ??? ? ? ?Hypertension Management-Blood Pressure on??06/22/21.??Satisfied by Christine Yu LPN  ??? ? ? ?Influenza Vaccine on??12/16/20.??Satisfied by Lalitha Fragoso RN  ??? ? ? ?Lipid Screening  on??02/04/21.??Satisfied by LeJeune, Stephanie A.  ??? ? ? ?Obesity Screening on??06/22/21.??Satisfied by Christine Yu LPN  ?  Lab Results  Test Name Test Result Date/Time Comments   CASTRO Pattern-[facility] Homogeneous (Abnormal) 01/27/2021 12:45 CST    CASTRO Titer-[facility] 1:320 (Abnormal) 01/27/2021 12:45 CST Performed By: [facility]  [address] [place] [id number]  : Karena Petit MD   CASTRO, HEp-2, IgG-[facility] Detected (Abnormal) 01/27/2021 12:45 CST

## 2022-05-02 NOTE — HISTORICAL [facility] CERNER
This is a historical note converted from Yasmine. Formatting and pictures may have been removed.  Please reference Yasmine for original formatting and attached multimedia. Chief Complaint  PT is here with complaints of vision issues, headaches and nausea.  History of Present Illness  This is a 60 yo Right Handed F with hx of tobacco use, Hypothyroidism, Posterior Vitreous Detachment OU, Vitamin D deficiency, who is referred from Ophthalmology clinic for OU optic nerve hyperemia on OCT in 1/17/2020 and headache d/o.??Patient was c/o seeing black glooby things floating around in her vision OD>OS at the time. She also notes that she would?see?flashes of?light out of her?right?eye at times. ?  ?  Age of Onset - around time of menstruation  ?   Semiology - right frontotemporal stabbing pain, lasting minutes to hrs, w/ nausea, w/ photophobia  ?   Frequency - 15-30 days/month since Jan. 2020  ?   Exacerbating?Factors - denied  ?   Risk Factors -  - Family history of headache disorder? denied  - History of Head Trauma? denied  - History of CNS infection? denied  - History of underlying mood disorder? denied  - Illicit drug use? denied  - Tobacco use? states that she had quit 15 year ago  - Alcohol use? denied  - History of sleep disorder? denied  ?   Workup -  - MRI Brain +/- Bob 2/7/2020- I have reviewed the study independently and with the patient. Unremarkable MRI Brain.  ?   - MRI Orbit +/- Bob 2/7/2020 - I have reviewed the study independently and with the patient. Unremarkable MRI Orbit.  ?  - DFE 1/17/2020:  Vitreous: wnl OU  Optic disc: CDR 0.1 OU, P/S/H OU  Macula: flat OU  Vessels: WNL OU  Periphery: flat 360 OU; no H/T/D  ?   - DFE 3/4/2020  Vitreous: wnl OU  Optic disc: CDR 0.05 OU, P/S/H OU  Macula: flat OU  Vessels: WNL OU  Periphery: flat 360 OU; no H/T/D  ?   - OCT RNFL 1/17/2020: 97//104  ?  - B-scan 1/17/2020: wide optic nerve shadow without definite optic nerve head drusen OU, no RD, or mass OU  ?  - HVF  3/4/2020: full OS, unreliable but mostly full OD.  ?  - ESR/CRP 2/7/2020 - 20 / < 0.3  -?Syphilis?Ab?2/7/2020 - negative  - TSH 9/30/2019 - 4.190  ?  Current?ppx meds?- non  ?  Current abortive meds -  Ibuprofen?- partially effective  ?  ?  Prior ppx meds -  Denied  ?  Prior abortive meds - denied  ?  Review of Systems  Constitutional:?no fever, fatigue, +?weakness  Eye:?+ vision loss, eye redness, drainage, or pain  ENMT:?no sore throat, ear pain, + sinus pain/congestion, nasal congestion/drainage  Respiratory:?no cough, no wheezing, no shortness of breath  Cardiovascular:?no chest pain, no palpitations, no edema  Gastrointestinal:?+ nausea, vomiting, or diarrhea. No abdominal pain  Genitourinary:?no dysuria, no urinary frequency or urgency, no hematuria  Hema/Lymph:?no abnormal bruising or bleeding  Endocrine:?no heat or cold intolerance, no excessive thirst or excessive urination  Musculoskeletal:?no muscle or joint pain, no joint swelling  Integumentary:?no skin rash or abnormal lesion  Psychiatric: no depressed mood,?+ anxiety, no visual/auditory hallucinations, no delusions,? no suicidal or homicidal ideation  Neurologic: as per HPI  ?  Physical Exam  Vitals & Measurements  T:?37.0? ?C (Oral)? HR:?63(Peripheral)? RR:?18? BP:?128/86? HT:?162?cm? WT:?59.3?kg?  General:?well-developed well-nourished in no acute distress  Eye: clear conjunctiva, eyelids normal,  HENT:?TMs/ear canals clear, oropharynx without erythema/exudate, oropharynx and nasal mucosal surfaces moist, no maxillary/frontal sinus tenderness to palpation  Neck: full range of motion, no thyromegaly or lymphadenopathy  Respiratory:?clear to auscultation bilaterally  Cardiovascular:?regular rate and rhythm without murmurs, gallops or rubs  Gastrointestinal:?soft, non-tender, non-distended with normal bowel sounds, without masses to palpation  Genitourinary: no CVA tenderness to palpation  Musculoskeletal:?full range of motion of all extremities/spine  without limitation or discomfort  Integumentary: no rashes or skin lesions present  Neurologic:  Vision?Exam?(Higher?Cortical Function/CN II/III/IV/VI)  OU Fundus Clear with Sharp Margins, no pallor, JAYY, no RAPD  ?   Visual Acuity w/ Hand Held Caryn Card and Ishihara Color Plate  ? w/o correction w/ correction Ishihara Color Plate   ?OD? 20/40? ?20/50 ?12/12   OS ?20/50 ?20/70 ?12/12   OU ?20/30 ?20/50 ?12/12   ?  No ptosis or latent delayed ptosis OU, EOMI w/o nystagmus on smooth pursuit or saccadic pursuit, unremarkable saccadic intrusion, no skew.  Visual field intact to confrontation OU  Intact reading.  ?   Mental Status- Alert and Oriented to time, self, place, Speech is fluent, with intact repetition, naming, reading, and comprehension. No Dysarthria.  CN V, VII-XII -? LT/PP symmetric, intact in CN V1-V3 distribution b/l, masseter symmetric b/l, T/U midline, Shoulder shrug symmetric b/l, Hearing grossly intact b/l, Face Symmetric.  Motor - Tone and Bulk nml throughout, No?involuntary movements, ?5/5 to confrontation throughout, FFM nml b/l, No pronator drift.  Sensory - LT/PP/Temp/Proprioception/Vibration symmetric.  Reflexes - ?2+ Throughout, Babinski negative.  Cerebellar Exam - ?FNF wnl b/l no intention tremor.  Romberg - negative.  Gait -?Normal, Toe gait normal, Heel gait wnl, Tandem gait nml  Assessment/Plan  1.?Optic neuropathy?H46.9  This is a 58 yo Right Handed F with hx of tobacco use, Hypothyroidism, Posterior Vitreous Detachment OU, Vitamin D deficiency, who is referred from Ophthalmology clinic for OU optic nerve hyperemia on OCT and chronic migraine w/o aura. Visual Acuity exam is still very unreliable. Patient opting not to start on any medication for?headache at this point in time.  ?  [] will check Vit B1, Vit B12, and Folate lv  [] will check for underlying autoimmune disease and paraneoplastic disease (Anti-CV2/Anti-CRMP)  [] will recheck thyroid function panel.  ?  ?  Headache  education provided - including good sleep hygiene, stress management, medication overuse education provided - using more 3 OTC per week may worsen headaches, High intensity interval training has shown to reduce headache frequency. Low carb, high protein has shown to reduce headache frequency. Patient is instructed to keep headache diary.  ?  I have explained the treatment plan, diagnosis, and prognosis to the patient, caretaker, family. All questions are answered to the best of my knowledge.  ?   Face to Face Time?60 minute with?35 minute in counseling, education, and coordination of care. ?  ?   rtc 3 months??  Ordered:  Angiotensin-Converting Enzyme-[facility] [id number], Routine collect, 03/27/20 9:15:00 CDT, Blood, Stop date 03/27/20 9:15:00 CDT, Lab Collect, Optic neuropathy, 03/27/20 9:15:00 CDT  Anti-dsDNA (Double-stranded) Abs-[facility] [id number], Routine collect, 03/27/20 9:15:00 CDT, Blood, Stop date 03/27/20 9:15:00 CDT, Lab Collect, Optic neuropathy, 03/27/20 9:15:00 CDT  Anti-Nuclear(CASTRO) IgG Ab w/ Rflx to IFA-[facility] [id number], Routine collect, 03/27/20 9:15:00 CDT, Blood, Stop date 03/27/20 9:15:00 CDT, Lab Collect, Optic neuropathy, 03/27/20 9:15:00 CDT  Aquaporin-4 Receptor [id number]-[id number], Routine collect, 03/27/20 9:15:00 CDT, Blood, Stop date 03/27/20 9:15:00 CDT, Lab Collect, Optic neuropathy, 03/27/20 9:15:00 CDT  Clinic Follow up, *Est. 06/26/20 8:30:00 CDT, Order for future visit, Optic neuropathy, [facility]  Complement C3-[facility] [id number], Routine collect, 03/27/20 9:15:00 CDT, Blood, Stop date 03/27/20 9:15:00 CDT, Lab Collect, Optic neuropathy, 03/27/20 9:15:00 CDT  Complement C4-[facility] [id number], Routine collect, 03/27/20 9:15:00 CDT, Blood, Stop date 03/27/20 9:15:00 CDT, Lab Collect, Optic neuropathy, 03/27/20 9:15:00 CDT  Folate Level, Routine collect, 03/27/20 9:15:00 CDT, Blood, Stop date 03/27/20 9:15:00 CDT, Lab Collect, Optic neuropathy, 03/27/20 9:15:00 CDT  Free T4, Routine  collect, 03/27/20 9:15:00 CDT, Blood, Stop date 03/27/20 9:15:00 CDT, Lab Collect, Optic neuropathy  Hypothyroidism, 03/27/20 9:15:00 CDT  Miscellaneous Lab Test, Routine collect, Blood, 03/27/20 9:15:00 CDT, Send Out [facility] - Paraneoplastic Reflexive Panel [id number], Lab Collect, Stop date 03/27/20 9:15:00 CDT, Optic neuropathy, Other  MPO/NE-3 (ANCA) Antibodies-[facility] [id number], Routine collect, 03/27/20 9:15:00 CDT, Blood, Stop date 03/27/20 9:15:00 CDT, Lab Collect, Optic neuropathy, 03/27/20 9:15:00 CDT  Office/Outpatient Visit Level 5 New [id number] PC, Optic neuropathy, [facility], 03/27/20 9:02:00 CDT  RNP and Nicholas Antibodies, [id number], Routine collect, 03/27/20 9:15:00 CDT, Blood, Stop date 03/27/20 9:15:00 CDT, Lab Collect, Optic neuropathy, 03/27/20 9:15:00 CDT  SSA and SSB IgG Dlraadbely-[id number], Routine collect, 03/27/20 9:15:00 CDT, Blood, Stop date 03/27/20 9:15:00 CDT, Lab Collect, Optic neuropathy, 03/27/20 9:15:00 CDT  Thyroid Stimulating Hormone, Routine collect, 03/27/20 9:15:00 CDT, Blood, Stop date 03/27/20 9:15:00 CDT, Lab Collect, Optic neuropathy  Hypothyroidism, 03/27/20 9:15:00 CDT  Vitamin B1 (Thiamine) Whole Blood-[facility] [id number], Routine collect, 03/27/20 9:15:00 CDT, Blood, Stop date 03/27/20 9:15:00 CDT, Lab Collect, Optic neuropathy, 03/27/20 9:15:00 CDT  Vitamin B12 Level, Routine collect, 03/27/20 9:15:00 CDT, Blood, Stop date 03/27/20 9:15:00 CDT, Lab Collect, Optic neuropathy, 03/27/20 9:15:00 CDT  ?  2.?Hypothyroidism?E03.9  Ordered:  Free T4, Routine collect, 03/27/20 9:15:00 CDT, Blood, Stop date 03/27/20 9:15:00 CDT, Lab Collect, Optic neuropathy  Hypothyroidism, 03/27/20 9:15:00 CDT  Thyroid Stimulating Hormone, Routine collect, 03/27/20 9:15:00 CDT, Blood, Stop date 03/27/20 9:15:00 CDT, Lab Collect, Optic neuropathy  Hypothyroidism, 03/27/20 9:15:00 CDT  ?  3.?Chronic migraine without aura without status migrainosus, not intractable?G43.709  ?   Problem List/Past  Medical History  Ongoing  Chronic migraine without aura without status migrainosus, not intractable  Hypothyroidism  Optic neuropathy  Posterior vitreous detachment, both eyes  Vitamin D deficiency  Historical  Hypothyroidism  Procedure/Surgical History  Mammogram (08/11/2010)   Medications  EPI-pen 1 mg/mL injectable solution, See Instructions, PRN, 1 refills  ergocalciferol 50,000 intl units (1.25 mg) oral capsule  levothyroxine 75 mcg (0.075 mg) oral tablet, 75 mcg= 1 tab(s), Oral, Daily,? ?Not taking: Last Dose Date/Time Unknown  levothyroxine 88 mcg (0.088 mg) oral tablet, 88 mcg= 1 tab(s), Oral, Daily  levothyroxine 88 mcg (0.088 mg) oral tablet  meloxicam 7.5 mg oral tablet, 7.5 mg= 1 tab(s), Oral, Daily, 2 refills,? ?Not taking: Last Dose Date/Time Unknown  ProAir HFA 90 mcg/inh inhalation aerosol with adapter, 1 puff(s), INH, q4hr, PRN, 3 refills  Right wrist splint, See Instructions,? ?Not taking: Last Dose Date/Time Unknown  Vitamin D, 07388 IntUnit, Oral, QOWK  Allergies  erythromycin?(abd pain)  liothyronine?(rash)  Social History  Abuse/Neglect  No, No, Yes, 10/04/2019  Alcohol  Never, 10/04/2019  Employment/School  Employed, 03/21/2018  Exercise  Exercise duration: 0., 03/21/2018  Home/Environment  Lives with Alone. Living situation: Home/Independent., 01/14/2019  Nutrition/Health  Type of diet: lactose intolerant. Regular, 03/21/2018  Sexual  Sexually active: No. Gender Identity Identifies as female., 01/14/2019  Sexually active: No., 03/21/2018  Substance Use  Never, 03/21/2018  Tobacco  Former smoker, quit more than 30 days ago, N/A, 01/17/2020  Family History  Acute myocardial infarction.: Father.  Hypertension.: Father and Brother.  Hyperthyroidism.: Mother.  Mitral valve disorder.: Mother.  Osteopenia: Brother.  Stroke: Father.  Health Maintenance  Health Maintenance  ???Pending?(in the next year)  ??? ??OverDue  ??? ? ? ?Diabetes Screening due??and every?  ??? ? ? ?Aspirin Therapy for CVD  Prevention due??04/18/19??and every 1??year(s)  ??? ? ? ?Alcohol Misuse Screening due??01/01/20??and every 1??year(s)  ??? ??Due?  ??? ? ? ?Tetanus Vaccine due??03/27/20??and every 10??year(s)  ??? ??Due In Future?  ??? ? ? ?Obesity Screening not due until??01/01/21??and every 1??year(s)  ??? ? ? ?Hypertension Management-BMP not due until??02/06/21??and every 1??year(s)  ??? ? ? ?Breast Cancer Screening not due until??02/12/21??and every 2??year(s)  ???Satisfied?(in the past 1 year)  ??? ??Satisfied?  ??? ? ? ?ADL Screening on??03/27/20.??Satisfied by Indy Rojo  ??? ? ? ?Alcohol Misuse Screening on??07/03/19.??Satisfied by Irene Sheffield LPN  ??? ? ? ?Blood Pressure Screening on??03/27/20.??Satisfied by Indy Rojo  ??? ? ? ?Body Mass Index Check on??03/27/20.??Satisfied by Indy Rojo  ??? ? ? ?Depression Screening on??03/27/20.??Satisfied by Indy Rojo  ??? ? ? ?Diabetes Screening on??02/07/20.??Satisfied by Tigist Mcnulty  ??? ? ? ?Hypertension Management-Blood Pressure on??03/27/20.??Satisfied by Indy Rojo  ??? ? ? ?Lipid Screening on??09/30/19.??Satisfied by Sonal Del Rosario  ??? ? ? ?Obesity Screening on??03/27/20.??Satisfied by Indy Rojo  ?  Lab Results  Test Name Test Result Date/Time Comments   Sodium Lvl 137 mmol/L 02/07/2020 06:20 CST    Potassium Lvl 4.8 mmol/L 02/07/2020 06:20 CST    Chloride 102 mmol/L 02/07/2020 06:20 CST    CO2 32 mmol/L 02/07/2020 06:20 CST    Calcium Lvl 9.9 mg/dL 02/07/2020 06:20 CST    Glucose Lvl 102 mg/dL 02/07/2020 06:20 CST    BUN 38 mg/dL (High) 02/07/2020 06:20 CST    Creatinine 1.00 mg/dL 02/07/2020 06:20 CST    BUN/Creat Ratio 38 02/07/2020 06:20 CST    eGFR-AA 73 mL/min (Low) 02/07/2020 06:20 CST    eGFR-JAKUB 61 mL/min (Low) 02/07/2020 06:20 CST    AST 25 unit/L 09/30/2019 08:15 CDT    ALT 45 unit/L 09/30/2019 08:15 CDT    CRP <0.3 mg/dL 02/07/2020 06:20 CST    TSH 4.190 mIU/L (High) 09/30/2019 08:15 CDT    Sed Rate 20 mm/hr 02/07/2020  06:20 CST Note: reference ranges for ages 50 and over have changed.   Syphilis Ab Nonreactive 02/07/2020 06:20 CST    Diagnostic Results  - MRI Brain +/- Bob 2/7/2020- I have reviewed the study independently and with the patient. Unremarkable MRI Brain.  ?   - MRI Orbit +/- Bob 2/7/2020 - I have reviewed the study independently and with the patient. Unremarkable MRI Orbit.  ?   - DFE 1/17/2020:  Vitreous: wnl OU  Optic disc: CDR 0.1 OU, P/S/H OU  Macula: flat OU  Vessels: WNL OU  Periphery: flat 360 OU; no H/T/D  ?  - DFE 3/4/2020  Vitreous: wnl OU  Optic disc: CDR 0.05 OU, P/S/H OU  Macula: flat OU  Vessels: WNL OU  Periphery: flat 360 OU; no H/T/D  ?  - OCT RNFL 1/17/2020: 97//104  ?  - B-scan 1/17/2020: wide optic nerve shadow without definite optic nerve head drusen OU, no RD, or mass OU  ?  - HVF 3/4/2020: full OS, unreliable but mostly full OD.

## 2022-06-02 ENCOUNTER — DOCUMENTATION ONLY (OUTPATIENT)
Dept: ADMINISTRATIVE | Facility: HOSPITAL | Age: 62
End: 2022-06-02
Payer: COMMERCIAL

## 2022-06-17 ENCOUNTER — TELEPHONE (OUTPATIENT)
Dept: ADMINISTRATIVE | Facility: HOSPITAL | Age: 62
End: 2022-06-17
Payer: COMMERCIAL

## 2022-06-17 DIAGNOSIS — Z12.31 ENCOUNTER FOR SCREENING MAMMOGRAM FOR MALIGNANT NEOPLASM OF BREAST: Primary | ICD-10-CM

## 2022-06-17 NOTE — TELEPHONE ENCOUNTER
Type:  Mammogram    Caller is requesting to schedule their annual mammogram appointment.  Order is not listed in EPIC.  Please enter order and contact patient to schedule.  Name of Caller:KRISTIN  [facility]  Would the patient rather a call back or a response via [facility]? CALL BACK  Best Call Back Number:[phone]  Additional Information:

## 2022-06-20 NOTE — TELEPHONE ENCOUNTER
Pt notified that the order for the mammogram was placed and faxed to breast center [facility]. Verbal understanding was given

## 2022-08-12 ENCOUNTER — DOCUMENTATION ONLY (OUTPATIENT)
Dept: FAMILY MEDICINE | Facility: CLINIC | Age: 62
End: 2022-08-12
Payer: COMMERCIAL

## 2022-08-12 LAB — BCS RECOMMENDATION EXT: NORMAL

## 2022-08-29 ENCOUNTER — TELEPHONE (OUTPATIENT)
Dept: FAMILY MEDICINE | Facility: CLINIC | Age: 62
End: 2022-08-29
Payer: COMMERCIAL

## 2022-08-29 NOTE — TELEPHONE ENCOUNTER
----- Message from Michelle Arroyo sent at 8/29/2022  9:22 AM CDT -----  Regarding: lab orders  .Type:  Needs Medical Advice    Who Called: pt   Symptoms (please be specific):    How long has patient had these symptoms:    Pharmacy name and phone #:    Would the patient rather a call back or a response via [facility]? Call back   Best Call Back Number: [phone]  Additional Information: pt would like lab orders to be put in the system for her appt on Sept. 12

## 2022-09-12 ENCOUNTER — OFFICE VISIT (OUTPATIENT)
Dept: FAMILY MEDICINE | Facility: CLINIC | Age: 62
End: 2022-09-12
Payer: COMMERCIAL

## 2022-09-12 VITALS
DIASTOLIC BLOOD PRESSURE: 85 MMHG | OXYGEN SATURATION: 99 % | HEIGHT: 63 IN | HEART RATE: 59 BPM | BODY MASS INDEX: 24.48 KG/M2 | RESPIRATION RATE: 18 BRPM | TEMPERATURE: 98 F | WEIGHT: 138.19 LBS | SYSTOLIC BLOOD PRESSURE: 134 MMHG

## 2022-09-12 DIAGNOSIS — G43.009 MIGRAINE WITHOUT AURA AND RESPONSIVE TO TREATMENT: ICD-10-CM

## 2022-09-12 DIAGNOSIS — L50.8 CHRONIC URTICARIA: ICD-10-CM

## 2022-09-12 DIAGNOSIS — E06.3 AUTOIMMUNE THYROIDITIS: ICD-10-CM

## 2022-09-12 DIAGNOSIS — E53.8 COBALAMIN DEFICIENCY: ICD-10-CM

## 2022-09-12 DIAGNOSIS — E03.9 HYPOTHYROIDISM, UNSPECIFIED TYPE: Primary | ICD-10-CM

## 2022-09-12 DIAGNOSIS — E55.9 VITAMIN D DEFICIENCY: ICD-10-CM

## 2022-09-12 DIAGNOSIS — E78.5 HYPERLIPIDEMIA, UNSPECIFIED HYPERLIPIDEMIA TYPE: ICD-10-CM

## 2022-09-12 DIAGNOSIS — H47.099 DISORDER OF OPTIC NERVE, UNSPECIFIED LATERALITY: ICD-10-CM

## 2022-09-12 DIAGNOSIS — R76.8 POSITIVE ANTINUCLEAR ANTIBODY: ICD-10-CM

## 2022-09-12 DIAGNOSIS — J30.9 ALLERGIC RHINITIS, UNSPECIFIED SEASONALITY, UNSPECIFIED TRIGGER: ICD-10-CM

## 2022-09-12 PROCEDURE — 1159F MED LIST DOCD IN RCRD: CPT | Mod: CPTII,,, | Performed by: STUDENT IN AN ORGANIZED HEALTH CARE EDUCATION/TRAINING PROGRAM

## 2022-09-12 PROCEDURE — 3008F BODY MASS INDEX DOCD: CPT | Mod: CPTII,,, | Performed by: STUDENT IN AN ORGANIZED HEALTH CARE EDUCATION/TRAINING PROGRAM

## 2022-09-12 PROCEDURE — 3008F PR BODY MASS INDEX (BMI) DOCUMENTED: ICD-10-PCS | Mod: CPTII,,, | Performed by: STUDENT IN AN ORGANIZED HEALTH CARE EDUCATION/TRAINING PROGRAM

## 2022-09-12 PROCEDURE — 3075F PR MOST RECENT SYSTOLIC BLOOD PRESS GE 130-139MM HG: ICD-10-PCS | Mod: CPTII,,, | Performed by: STUDENT IN AN ORGANIZED HEALTH CARE EDUCATION/TRAINING PROGRAM

## 2022-09-12 PROCEDURE — 1159F PR MEDICATION LIST DOCUMENTED IN MEDICAL RECORD: ICD-10-PCS | Mod: CPTII,,, | Performed by: STUDENT IN AN ORGANIZED HEALTH CARE EDUCATION/TRAINING PROGRAM

## 2022-09-12 PROCEDURE — 3079F PR MOST RECENT DIASTOLIC BLOOD PRESSURE 80-89 MM HG: ICD-10-PCS | Mod: CPTII,,, | Performed by: STUDENT IN AN ORGANIZED HEALTH CARE EDUCATION/TRAINING PROGRAM

## 2022-09-12 PROCEDURE — 99214 OFFICE O/P EST MOD 30 MIN: CPT | Mod: ,,, | Performed by: STUDENT IN AN ORGANIZED HEALTH CARE EDUCATION/TRAINING PROGRAM

## 2022-09-12 PROCEDURE — 3079F DIAST BP 80-89 MM HG: CPT | Mod: CPTII,,, | Performed by: STUDENT IN AN ORGANIZED HEALTH CARE EDUCATION/TRAINING PROGRAM

## 2022-09-12 PROCEDURE — 3075F SYST BP GE 130 - 139MM HG: CPT | Mod: CPTII,,, | Performed by: STUDENT IN AN ORGANIZED HEALTH CARE EDUCATION/TRAINING PROGRAM

## 2022-09-12 PROCEDURE — 99214 PR OFFICE/OUTPT VISIT, EST, LEVL IV, 30-39 MIN: ICD-10-PCS | Mod: ,,, | Performed by: STUDENT IN AN ORGANIZED HEALTH CARE EDUCATION/TRAINING PROGRAM

## 2022-09-12 RX ORDER — TALC
2 POWDER (GRAM) TOPICAL DAILY
COMMUNITY

## 2022-09-12 RX ORDER — PNV NO.95/FERROUS FUM/FOLIC AC 28MG-0.8MG
100 TABLET ORAL DAILY
COMMUNITY

## 2022-09-12 RX ORDER — LEVOTHYROXINE SODIUM 88 UG/1
88 TABLET ORAL DAILY
COMMUNITY
Start: 2022-07-15 | End: 2023-01-09 | Stop reason: SDUPTHER

## 2022-09-12 RX ORDER — ERGOCALCIFEROL 1.25 MG/1
50000 CAPSULE ORAL
COMMUNITY
Start: 2022-09-07 | End: 2023-03-15

## 2022-09-12 RX ORDER — EPINEPHRINE 0.3 MG/.3ML
INJECTION SUBCUTANEOUS ONCE
COMMUNITY

## 2022-09-12 NOTE — ASSESSMENT & PLAN NOTE
- Patient following with Ophthalmology, but states she is stable; therefore, she has no future follow-ups scheduled.

## 2022-09-12 NOTE — ASSESSMENT & PLAN NOTE
- Patient following with Dr. Nano Morales with Neurology, but states she is stable; therefore, she has no future follow-ups scheduled.  - Continue magnesium oxide 500mg daily and Riboflavin 100mg daily.

## 2022-09-12 NOTE — ASSESSMENT & PLAN NOTE
- Patient following with Dr. Roe Mills with Rheumatology, but states she is stable; therefore, she has no future follow-ups scheduled.

## 2022-09-12 NOTE — ASSESSMENT & PLAN NOTE
- Patient following with Dr. Win Sosa with Endocrinology, but states she is stable; therefore, she has no future follow-ups scheduled.  - Patient following with Dr. Roe Mills with Rheumatology, but states she is stable; therefore, she has no future follow-ups scheduled.

## 2022-09-12 NOTE — ASSESSMENT & PLAN NOTE
- Patient following with Dr. Win Sosa with Endocrinology, but states she is stable; therefore, she has no future follow-ups scheduled.  - Continue Synthroid 88mcg daily.

## 2022-09-12 NOTE — PROGRESS NOTES
Subjective:      Patient ID: Prisca Saenz is a 62 y.o.  female.    Chief Complaint: Chronic Medical Management    Hypothyroidism/Autoimmune Thyroiditis: Patient following with Dr. Win Sosa with Endocrinology, but states she is stable; therefore, she has no future follow-ups scheduled. Patient states compliance with Synthroid daily.    Migraines/B12 Deficiency/Optic Nerve Disorder/Vitamin D Deficiency: Patient following with Dr. Nano Morales with Neurology, but states she is stable; therefore, she has no future follow-ups scheduled. Patient taking Magnesium and Riboflavin for her migraines. She also takes B12 supplementation daily. Patient following with Ophthalmology, but states she is stable; therefore, she has no future follow-ups scheduled. Patient takes vitamin D supplementation every 2 weeks.    Allergic Rhinitis/Chronic Urticaria/CASTRO Positive/Autoimmune Thyroiditis: Patient following with Dr. Jairo Hutchins with Allergy-Immunology, but states she is stable; therefore, she has no future follow-ups scheduled. Patient following with Dr. Roe Mills with Rheumatology, but states she is stable; therefore, she has no future follow-ups scheduled.     Preventative Health: Wellness performed on 03/07/22. Pap smear/HPV negative from 02/11/21. Mammogram negative from 08/12/22. Cologuard negative from 03/15/22.    Review of Systems   Constitutional:  Negative for activity change, fatigue and fever.   Eyes:  Negative for pain and visual disturbance.   Respiratory:  Negative for apnea, cough and shortness of breath.    Cardiovascular:  Negative for chest pain and leg swelling.   Gastrointestinal:  Negative for abdominal pain, diarrhea and nausea.   Genitourinary:  Negative for dysuria and hematuria.   Musculoskeletal:  Negative for arthralgias, back pain and myalgias.   Skin:  Negative for rash and wound.   Allergic/Immunologic: Positive for environmental allergies.   Neurological:  Positive for headaches  "(migraines). Negative for weakness.   Psychiatric/Behavioral:  Negative for behavioral problems and dysphoric mood.      Objective:   /85 (BP Location: Right arm, Patient Position: Sitting, BP Method: Large (Automatic))   Pulse (!) 59   Temp 98.4 °F (36.9 °C) (Temporal)   Resp 18   Ht 5' 3" (1.6 m)   Wt 62.7 kg (138 lb 3.2 oz)   SpO2 99%   BMI 24.48 kg/m²     Physical Exam  Vitals and nursing note reviewed.   Constitutional:       General: She is not in acute distress.     Appearance: Normal appearance. She is not ill-appearing or toxic-appearing.   HENT:      Head: Normocephalic and atraumatic.      Mouth/Throat:      Mouth: Mucous membranes are moist.      Pharynx: Oropharynx is clear.   Cardiovascular:      Rate and Rhythm: Normal rate and regular rhythm.      Heart sounds: Normal heart sounds. No murmur heard.  Pulmonary:      Effort: Pulmonary effort is normal. No respiratory distress.      Breath sounds: Normal breath sounds.   Abdominal:      General: There is no distension.      Palpations: Abdomen is soft.      Tenderness: There is no abdominal tenderness.   Musculoskeletal:         General: Normal range of motion.      Cervical back: Neck supple. No tenderness.      Right lower leg: No edema.      Left lower leg: No edema.   Lymphadenopathy:      Cervical: No cervical adenopathy.   Skin:     General: Skin is warm and dry.      Findings: No lesion or rash.   Neurological:      General: No focal deficit present.      Mental Status: She is alert. Mental status is at baseline.   Psychiatric:         Mood and Affect: Mood normal.         Behavior: Behavior normal.         Thought Content: Thought content normal.         Judgment: Judgment normal.     Assessment:     1. Hypothyroidism, unspecified type    2. Autoimmune thyroiditis    3. Migraine without aura and responsive to treatment    4. Cobalamin deficiency    5. Disorder of optic nerve, unspecified laterality    6. Vitamin D deficiency    7. " Allergic rhinitis, unspecified seasonality, unspecified trigger    8. Chronic urticaria    9. Positive antinuclear antibody    10. Hyperlipidemia, unspecified hyperlipidemia type      Plan:     Problem List Items Addressed This Visit          Neuro    Migraine without aura and responsive to treatment     - Patient following with Dr. Nano Morales with Neurology, but states she is stable; therefore, she has no future follow-ups scheduled.  - Continue magnesium oxide 500mg daily and Riboflavin 100mg daily.              Ophtho    Optic nerve disorder     - Patient following with Ophthalmology, but states she is stable; therefore, she has no future follow-ups scheduled.            ENT    Allergic rhinitis     -  Patient following with Dr. Jairo Hutchins with Allergy-Immunology, but states she is stable; therefore, she has no future follow-ups scheduled.              Derm    Chronic urticaria     - Refer to Allergic rhinitis plan.              Cardiac/Vascular    Hyperlipidemia     - Lipid panel negative from 04/21/22.            Immunology/Multi System    Positive antinuclear antibody     - Patient following with Dr. Roe Mills with Rheumatology, but states she is stable; therefore, she has no future follow-ups scheduled.               Endocrine    Hypothyroidism - Primary     - Patient following with Dr. Win Sosa with Endocrinology, but states she is stable; therefore, she has no future follow-ups scheduled.  - Continue Synthroid 88mcg daily.         Autoimmune thyroiditis     - Patient following with Dr. Win Sosa with Endocrinology, but states she is stable; therefore, she has no future follow-ups scheduled.  - Patient following with Dr. Roe Mills with Rheumatology, but states she is stable; therefore, she has no future follow-ups scheduled.           Cobalamin deficiency     - Continue B12 supplementation.           Vitamin D deficiency     - Patient taking vitamin D supplementation every 2 weeks.

## 2022-09-12 NOTE — ASSESSMENT & PLAN NOTE
-  Patient following with Dr. Jairo Hutchins with Allergy-Immunology, but states she is stable; therefore, she has no future follow-ups scheduled.

## 2022-09-17 ENCOUNTER — HISTORICAL (OUTPATIENT)
Dept: ADMINISTRATIVE | Facility: HOSPITAL | Age: 62
End: 2022-09-17
Payer: COMMERCIAL

## 2023-01-09 DIAGNOSIS — E03.9 HYPOTHYROIDISM, UNSPECIFIED TYPE: Primary | ICD-10-CM

## 2023-01-09 RX ORDER — LEVOTHYROXINE SODIUM 88 UG/1
88 TABLET ORAL DAILY
Qty: 90 TABLET | Refills: 0 | Status: SHIPPED | OUTPATIENT
Start: 2023-01-09 | End: 2023-03-15 | Stop reason: SDUPTHER

## 2023-01-09 NOTE — TELEPHONE ENCOUNTER
----- Message from Michelle Cruz sent at 1/9/2023  8:29 AM CST -----  Regarding: med refill  .Type:  RX Refill Request    Who Called: pt   Refill or New Rx:refill   RX Name and Strength:levothyroxine (SYNTHROID) 88 MCG tablet  How is the patient currently taking it? (ex. 1XDay):1xday   Is this a 30 day or 90 day RX:90  Preferred Pharmacy with phone number:[facility] - [address] AT [facility]  Local or Mail Order:mail order   Ordering Provider:Iris   Would the patient rather a call back or a response via [facility]? Call back   Best Call Back Number:[phone]  Additional Information: pt states that she needs a new script for this medication

## 2023-02-24 ENCOUNTER — TELEPHONE (OUTPATIENT)
Dept: FAMILY MEDICINE | Facility: CLINIC | Age: 63
End: 2023-02-24
Payer: COMMERCIAL

## 2023-02-24 DIAGNOSIS — Z11.4 ENCOUNTER FOR SCREENING FOR HIV: ICD-10-CM

## 2023-02-24 DIAGNOSIS — R73.9 HYPERGLYCEMIA: ICD-10-CM

## 2023-02-24 DIAGNOSIS — E03.9 HYPOTHYROIDISM, UNSPECIFIED TYPE: ICD-10-CM

## 2023-02-24 DIAGNOSIS — Z11.59 ENCOUNTER FOR HEPATITIS C SCREENING TEST FOR LOW RISK PATIENT: ICD-10-CM

## 2023-02-24 DIAGNOSIS — Z00.00 ANNUAL PHYSICAL EXAM: Primary | ICD-10-CM

## 2023-02-24 DIAGNOSIS — Z13.220 NEED FOR LIPID SCREENING: ICD-10-CM

## 2023-02-24 DIAGNOSIS — E55.9 VITAMIN D DEFICIENCY: ICD-10-CM

## 2023-02-24 NOTE — TELEPHONE ENCOUNTER
----- Message from Evi Gutiérrez sent at 2/24/2023  9:47 AM CST -----  Regarding: Lab orders  Type:  Needs Medical Advice    Who Called: Prisca    Would the patient rather a call back or a response via [facility]? Call back    Best Call Back Number: [phone]    Additional Information:  Prisca inquired if she needs labs prior to her appt on 03/15. She also  requested if possible please add thyroid and Vitamin D to the orders. She is requesting a call back if blood work Is needed.

## 2023-02-24 NOTE — TELEPHONE ENCOUNTER
Pt requesting labs prior to her March appt. She would like the thyroid and Vit D level checked also. Please advise

## 2023-02-24 NOTE — TELEPHONE ENCOUNTER
I have ordered the following labs. Please notify the patient.    Orders Placed This Encounter   Procedures    TSH     Standing Status:   Future     Standing Expiration Date:   5/24/2023    Vitamin D     Standing Status:   Future     Standing Expiration Date:   5/24/2023    HIV 1/2 Ag/Ab (4th Gen)     Standing Status:   Future     Standing Expiration Date:   4/24/2024     Order Specific Question:   Release to patient     Answer:   Immediate    Lipid Panel     Standing Status:   Future     Standing Expiration Date:   5/24/2023    Hepatitis C Antibody     Standing Status:   Future     Standing Expiration Date:   4/24/2024     Order Specific Question:   Release to patient     Answer:   Immediate    Basic Metabolic Panel     Standing Status:   Future     Standing Expiration Date:   5/24/2023    Hemoglobin A1C     Standing Status:   Future     Standing Expiration Date:   5/24/2023

## 2023-02-24 NOTE — TELEPHONE ENCOUNTER
Pt notified of that the lab orders have been placed and that she needs to be fasting. Verbal understanding was given

## 2023-03-09 ENCOUNTER — LAB VISIT (OUTPATIENT)
Dept: LAB | Facility: HOSPITAL | Age: 63
End: 2023-03-09
Attending: STUDENT IN AN ORGANIZED HEALTH CARE EDUCATION/TRAINING PROGRAM
Payer: COMMERCIAL

## 2023-03-09 DIAGNOSIS — Z11.59 ENCOUNTER FOR HEPATITIS C SCREENING TEST FOR LOW RISK PATIENT: ICD-10-CM

## 2023-03-09 DIAGNOSIS — E03.9 HYPOTHYROIDISM, UNSPECIFIED TYPE: ICD-10-CM

## 2023-03-09 DIAGNOSIS — Z00.00 ANNUAL PHYSICAL EXAM: ICD-10-CM

## 2023-03-09 DIAGNOSIS — Z13.220 NEED FOR LIPID SCREENING: ICD-10-CM

## 2023-03-09 DIAGNOSIS — E55.9 VITAMIN D DEFICIENCY: ICD-10-CM

## 2023-03-09 DIAGNOSIS — R73.9 HYPERGLYCEMIA: ICD-10-CM

## 2023-03-09 DIAGNOSIS — Z11.4 ENCOUNTER FOR SCREENING FOR HIV: ICD-10-CM

## 2023-03-09 LAB
ANION GAP SERPL CALC-SCNC: 10 MEQ/L
BUN SERPL-MCNC: 11 MG/DL (ref 9.8–20.1)
CALCIUM SERPL-MCNC: 9.5 MG/DL (ref 8.4–10.2)
CHLORIDE SERPL-SCNC: 107 MMOL/L (ref 98–107)
CHOLEST SERPL-MCNC: 203 MG/DL
CHOLEST/HDLC SERPL: 3 {RATIO} (ref 0–5)
CO2 SERPL-SCNC: 24 MMOL/L (ref 23–31)
CREAT SERPL-MCNC: 0.79 MG/DL (ref 0.55–1.02)
CREAT/UREA NIT SERPL: 14
DEPRECATED CALCIDIOL+CALCIFEROL SERPL-MC: 36.3 NG/ML (ref 30–80)
EST. AVERAGE GLUCOSE BLD GHB EST-MCNC: 96.8 MG/DL
GFR SERPLBLD CREATININE-BSD FMLA CKD-EPI: >60 MLS/MIN/1.73/M2
GLUCOSE SERPL-MCNC: 107 MG/DL (ref 82–115)
HBA1C MFR BLD: 5 %
HCV AB SERPL QL IA: NONREACTIVE
HDLC SERPL-MCNC: 66 MG/DL (ref 35–60)
HIV 1+2 AB+HIV1 P24 AG SERPL QL IA: NONREACTIVE
LDLC SERPL CALC-MCNC: 123 MG/DL (ref 50–140)
POTASSIUM SERPL-SCNC: 3.8 MMOL/L (ref 3.5–5.1)
SODIUM SERPL-SCNC: 141 MMOL/L (ref 136–145)
TRIGL SERPL-MCNC: 72 MG/DL (ref 37–140)
TSH SERPL-ACNC: 1.21 UIU/ML (ref 0.35–4.94)
VLDLC SERPL CALC-MCNC: 14 MG/DL

## 2023-03-09 PROCEDURE — 80048 BASIC METABOLIC PNL TOTAL CA: CPT

## 2023-03-09 PROCEDURE — 80061 LIPID PANEL: CPT

## 2023-03-09 PROCEDURE — 36415 COLL VENOUS BLD VENIPUNCTURE: CPT

## 2023-03-09 PROCEDURE — 84443 ASSAY THYROID STIM HORMONE: CPT

## 2023-03-09 PROCEDURE — 82306 VITAMIN D 25 HYDROXY: CPT

## 2023-03-09 PROCEDURE — 86803 HEPATITIS C AB TEST: CPT

## 2023-03-09 PROCEDURE — 83036 HEMOGLOBIN GLYCOSYLATED A1C: CPT

## 2023-03-09 PROCEDURE — 87389 HIV-1 AG W/HIV-1&-2 AB AG IA: CPT

## 2023-03-15 ENCOUNTER — OFFICE VISIT (OUTPATIENT)
Dept: FAMILY MEDICINE | Facility: CLINIC | Age: 63
End: 2023-03-15
Payer: COMMERCIAL

## 2023-03-15 VITALS
OXYGEN SATURATION: 96 % | SYSTOLIC BLOOD PRESSURE: 124 MMHG | BODY MASS INDEX: 23.36 KG/M2 | TEMPERATURE: 98 F | RESPIRATION RATE: 18 BRPM | DIASTOLIC BLOOD PRESSURE: 82 MMHG | HEART RATE: 54 BPM | HEIGHT: 63 IN | WEIGHT: 131.81 LBS

## 2023-03-15 DIAGNOSIS — E53.8 COBALAMIN DEFICIENCY: ICD-10-CM

## 2023-03-15 DIAGNOSIS — R76.8 POSITIVE ANTINUCLEAR ANTIBODY: ICD-10-CM

## 2023-03-15 DIAGNOSIS — E55.9 VITAMIN D DEFICIENCY: ICD-10-CM

## 2023-03-15 DIAGNOSIS — R00.1 BRADYCARDIA: ICD-10-CM

## 2023-03-15 DIAGNOSIS — J30.9 ALLERGIC RHINITIS, UNSPECIFIED SEASONALITY, UNSPECIFIED TRIGGER: ICD-10-CM

## 2023-03-15 DIAGNOSIS — Z00.00 ANNUAL PHYSICAL EXAM: Primary | ICD-10-CM

## 2023-03-15 DIAGNOSIS — L50.8 CHRONIC URTICARIA: ICD-10-CM

## 2023-03-15 DIAGNOSIS — G43.009 MIGRAINE WITHOUT AURA AND RESPONSIVE TO TREATMENT: ICD-10-CM

## 2023-03-15 DIAGNOSIS — E06.3 AUTOIMMUNE THYROIDITIS: ICD-10-CM

## 2023-03-15 DIAGNOSIS — E03.9 HYPOTHYROIDISM, UNSPECIFIED TYPE: ICD-10-CM

## 2023-03-15 DIAGNOSIS — H47.099 DISORDER OF OPTIC NERVE, UNSPECIFIED LATERALITY: ICD-10-CM

## 2023-03-15 PROCEDURE — 99396 PR PREVENTIVE VISIT,EST,40-64: ICD-10-PCS | Mod: ,,, | Performed by: STUDENT IN AN ORGANIZED HEALTH CARE EDUCATION/TRAINING PROGRAM

## 2023-03-15 PROCEDURE — 3008F BODY MASS INDEX DOCD: CPT | Mod: CPTII,,, | Performed by: STUDENT IN AN ORGANIZED HEALTH CARE EDUCATION/TRAINING PROGRAM

## 2023-03-15 PROCEDURE — 3074F SYST BP LT 130 MM HG: CPT | Mod: CPTII,,, | Performed by: STUDENT IN AN ORGANIZED HEALTH CARE EDUCATION/TRAINING PROGRAM

## 2023-03-15 PROCEDURE — 3074F PR MOST RECENT SYSTOLIC BLOOD PRESSURE < 130 MM HG: ICD-10-PCS | Mod: CPTII,,, | Performed by: STUDENT IN AN ORGANIZED HEALTH CARE EDUCATION/TRAINING PROGRAM

## 2023-03-15 PROCEDURE — 1159F PR MEDICATION LIST DOCUMENTED IN MEDICAL RECORD: ICD-10-PCS | Mod: CPTII,,, | Performed by: STUDENT IN AN ORGANIZED HEALTH CARE EDUCATION/TRAINING PROGRAM

## 2023-03-15 PROCEDURE — 3079F PR MOST RECENT DIASTOLIC BLOOD PRESSURE 80-89 MM HG: ICD-10-PCS | Mod: CPTII,,, | Performed by: STUDENT IN AN ORGANIZED HEALTH CARE EDUCATION/TRAINING PROGRAM

## 2023-03-15 PROCEDURE — 3008F PR BODY MASS INDEX (BMI) DOCUMENTED: ICD-10-PCS | Mod: CPTII,,, | Performed by: STUDENT IN AN ORGANIZED HEALTH CARE EDUCATION/TRAINING PROGRAM

## 2023-03-15 PROCEDURE — 3079F DIAST BP 80-89 MM HG: CPT | Mod: CPTII,,, | Performed by: STUDENT IN AN ORGANIZED HEALTH CARE EDUCATION/TRAINING PROGRAM

## 2023-03-15 PROCEDURE — 1159F MED LIST DOCD IN RCRD: CPT | Mod: CPTII,,, | Performed by: STUDENT IN AN ORGANIZED HEALTH CARE EDUCATION/TRAINING PROGRAM

## 2023-03-15 PROCEDURE — 99396 PREV VISIT EST AGE 40-64: CPT | Mod: ,,, | Performed by: STUDENT IN AN ORGANIZED HEALTH CARE EDUCATION/TRAINING PROGRAM

## 2023-03-15 RX ORDER — MULTIVIT WITH MINERALS/HERBS
1 TABLET ORAL DAILY
COMMUNITY

## 2023-03-15 RX ORDER — LEVOTHYROXINE SODIUM 88 UG/1
88 TABLET ORAL DAILY
Qty: 90 TABLET | Refills: 3 | Status: SHIPPED | OUTPATIENT
Start: 2023-03-15 | End: 2024-03-20 | Stop reason: SDUPTHER

## 2023-03-15 NOTE — ASSESSMENT & PLAN NOTE
- Patient following with Dr. Nano Morales with Neurology, but states she is stable; therefore, she has no future follow-ups scheduled.  - Continue magnesium oxide 500mg daily and B12 100mcg daily.

## 2023-03-15 NOTE — ASSESSMENT & PLAN NOTE
- Patient following with Dr. Win Sosa with Endocrinology, but states she is stable; therefore, she has no future follow-ups scheduled.  - TSH negative.  - Continue Synthroid 88mcg daily.

## 2023-03-15 NOTE — PROGRESS NOTES
Subjective:      Patient ID: Prisca Saenz is a 62 y.o.  female.    Chief Complaint: Wellness    Preventative Health: BMP positive for hyperglycemia with glucose 107. Lipid panel positive for elevated cholesterol 203. A1c negative. Pap smear/HPV negative from 02/11/21. Mammogram negative from 08/12/22. Cologuard negative from 03/15/22. Hepatitis C Antibody. HIV negative.    Hypothyroidism/Autoimmune Thyroiditis: TSH negative. Patient following with Dr. Win Sosa with Endocrinology, but states she is stable; therefore, she has no future follow-ups scheduled. Patient states compliance with Synthroid daily.    Migraines/B12 Deficiency/Optic Nerve Disorder/Vitamin D Deficiency: Vitamin D level negative. Patient following with Dr. Nano Morales with Neurology, but states she is stable; therefore, she has no future follow-ups scheduled. Patient taking Magnesium and B12 for her migraines. Patient following with Ophthalmology, but states she is stable; therefore, she has no future follow-ups scheduled. She says her current regimen keeps her from having migraines. Patient is taking a multi-vitamin that has vitamin D in it.    Allergic Rhinitis/Chronic Urticaria/CASTRO Positive/Autoimmune Thyroiditis: Patient following with Dr. Jairo Hutchins with Allergy-Immunology, but states she is stable; therefore, she has no future follow-ups scheduled. Patient following with Dr. Roe Mills with Rheumatology, but states she is stable; therefore, she has no future follow-ups scheduled. Patient states she's changed her diet and this has helped.    Bradycardia: No symptoms.    Review of Systems   Constitutional:  Negative for activity change, fatigue and fever.   Eyes:  Negative for pain and visual disturbance.   Respiratory:  Negative for apnea, cough and shortness of breath.    Cardiovascular:  Negative for chest pain and leg swelling.   Gastrointestinal:  Negative for abdominal pain, diarrhea and nausea.   Genitourinary:   "Negative for dysuria and hematuria.   Musculoskeletal:  Positive for arthralgias and joint swelling (ankles). Negative for back pain and myalgias.   Skin:  Negative for rash and wound.   Allergic/Immunologic: Positive for environmental allergies.   Neurological:  Negative for weakness and headaches.   Psychiatric/Behavioral:  Negative for behavioral problems and dysphoric mood.      Objective:   /82 (BP Location: Right arm, Patient Position: Sitting, BP Method: Large (Automatic))   Pulse (!) 54   Temp 98.3 °F (36.8 °C) (Oral)   Resp 18   Ht 5' 3" (1.6 m)   Wt 59.8 kg (131 lb 12.8 oz)   SpO2 96%   BMI 23.35 kg/m²     Physical Exam  Vitals and nursing note reviewed.   Constitutional:       General: She is not in acute distress.     Appearance: Normal appearance. She is not ill-appearing or toxic-appearing.   HENT:      Head: Normocephalic and atraumatic.      Mouth/Throat:      Mouth: Mucous membranes are moist.      Pharynx: Oropharynx is clear.   Eyes:      Conjunctiva/sclera: Conjunctivae normal.   Cardiovascular:      Rate and Rhythm: Regular rhythm. Bradycardia present.      Heart sounds: Normal heart sounds. No murmur heard.  Pulmonary:      Effort: Pulmonary effort is normal. No respiratory distress.      Breath sounds: Normal breath sounds.   Abdominal:      General: There is no distension.      Palpations: Abdomen is soft.      Tenderness: There is no abdominal tenderness.   Musculoskeletal:         General: No swelling. Normal range of motion.      Cervical back: Neck supple. No tenderness.      Right lower leg: No edema.      Left lower leg: No edema.   Lymphadenopathy:      Cervical: No cervical adenopathy.   Skin:     General: Skin is warm and dry.      Findings: No lesion or rash.   Neurological:      General: No focal deficit present.      Mental Status: She is alert. Mental status is at baseline.   Psychiatric:         Mood and Affect: Mood normal.         Behavior: Behavior normal.        "  Thought Content: Thought content normal.         Judgment: Judgment normal.     Assessment/Plan:   1. Annual physical exam  Comments:  - Health maintenance reviewed with patient.  - Lab results reviewed with patient.    2. Hypothyroidism, unspecified type  Assessment & Plan:  - Patient following with Dr. Win Sosa with Endocrinology, but states she is stable; therefore, she has no future follow-ups scheduled.  - TSH negative.  - Continue Synthroid 88mcg daily.    Orders:  -     levothyroxine (SYNTHROID) 88 MCG tablet; Take 1 tablet (88 mcg total) by mouth once daily.  Dispense: 90 tablet; Refill: 3    3. Autoimmune thyroiditis  Assessment & Plan:  - Patient following with Dr. Win Sosa with Endocrinology, but states she is stable; therefore, she has no future follow-ups scheduled.  - Patient following with Dr. Roe Mills with Rheumatology, but states she is stable; therefore, she has no future follow-ups scheduled.        Orders:  -     levothyroxine (SYNTHROID) 88 MCG tablet; Take 1 tablet (88 mcg total) by mouth once daily.  Dispense: 90 tablet; Refill: 3    4. Migraine without aura and responsive to treatment  Assessment & Plan:  - Patient following with Dr. Nano Morales with Neurology, but states she is stable; therefore, she has no future follow-ups scheduled.  - Continue magnesium oxide 500mg daily and B12 100mcg daily.        5. Disorder of optic nerve, unspecified laterality  Assessment & Plan:  - Patient following with Ophthalmology, but states she is stable; therefore, she has no future follow-ups scheduled.      6. Cobalamin deficiency  Assessment & Plan:  - Continue B12 supplementation.        7. Vitamin D deficiency  Assessment & Plan:  - Vitamin D level negative.  - Patient is taking a multi-vitamin that has vitamin D in it.      8. Allergic rhinitis, unspecified seasonality, unspecified trigger  Assessment & Plan:  -  Patient following with Dr. Jairo Hutchins with Allergy-Immunology, but  states she is stable; therefore, she has no future follow-ups scheduled.        9. Chronic urticaria  Assessment & Plan:  - Refer to Allergic rhinitis plan.        10. Positive antinuclear antibody  Assessment & Plan:  - Patient following with Dr. Roe Mills with Rheumatology, but states she is stable; therefore, she has no future follow-ups scheduled.         11. Bradycardia  Comments:  - No symptoms.       Follow up in about 1 year (around 3/15/2024) for Wellness.

## 2023-05-23 ENCOUNTER — TELEPHONE (OUTPATIENT)
Dept: FAMILY MEDICINE | Facility: CLINIC | Age: 63
End: 2023-05-23
Payer: COMMERCIAL

## 2023-05-23 DIAGNOSIS — Z12.31 ENCOUNTER FOR SCREENING MAMMOGRAM FOR MALIGNANT NEOPLASM OF BREAST: Primary | ICD-10-CM

## 2023-05-23 NOTE — TELEPHONE ENCOUNTER
----- Message from Khushboo Rye MA sent at 5/23/2023  3:44 PM CDT -----  Regarding: FW: mammo order    ----- Message -----  From: Jaleesa Pina  Sent: 5/23/2023   3:02 PM CDT  To: [facility]  Subject: mammo order                                      .Type:  Mammogram    Caller is requesting to schedule their annual mammogram appointment.  Order is not listed in EPIC.  Please enter order and contact patient to schedule.  Name of Caller: patient  Where would they like the mammogram performed? [facility] in [place]  Would the patient rather a call back or a response via [facility]?    Best Call Back Number: fax: [phone]  Additional Information:  patient mammo order needs to be faxed over for her 8/14 appointment.

## 2023-05-23 NOTE — TELEPHONE ENCOUNTER
I have ordered the following. Please notify the patient.    Orders Placed This Encounter   Procedures    Mammo Digital Screening Bilat w/ Ronnie     Standing Status:   Future     Standing Expiration Date:   5/23/2025     Scheduling Instructions:      [facility] [place]     Order Specific Question:   May the Radiologist modify the order per protocol to meet the clinical needs of the patient?     Answer:   Yes     Order Specific Question:   Release to patient     Answer:   Immediate

## 2023-08-08 ENCOUNTER — TELEPHONE (OUTPATIENT)
Dept: FAMILY MEDICINE | Facility: CLINIC | Age: 63
End: 2023-08-08
Payer: COMMERCIAL

## 2023-08-08 NOTE — TELEPHONE ENCOUNTER
----- Message from Elissa Saravia sent at 8/8/2023  1:12 PM CDT -----  Regarding: mammogram  Type:  Mammogram    Caller is requesting to schedule their annual mammogram appointment.  Order is not listed in EPIC.  Please enter order and contact patient to schedule.  Name of Caller:pt  Where would they like the mammogram performed?[facility] [address]  Would the patient rather a call back or a response via [facility]? C/b  Best Call Back Number:[phone]  Additional Information: pt stated [facility] said the never rcvd fax orders, please resend  Pt is scheduled for 8/14

## 2023-08-23 LAB — BCS RECOMMENDATION EXT: NORMAL

## 2023-08-28 ENCOUNTER — DOCUMENTATION ONLY (OUTPATIENT)
Dept: FAMILY MEDICINE | Facility: CLINIC | Age: 63
End: 2023-08-28
Payer: COMMERCIAL

## 2023-08-30 ENCOUNTER — DOCUMENTATION ONLY (OUTPATIENT)
Dept: FAMILY MEDICINE | Facility: CLINIC | Age: 63
End: 2023-08-30
Payer: COMMERCIAL

## 2023-09-07 ENCOUNTER — PATIENT MESSAGE (OUTPATIENT)
Dept: RESEARCH | Facility: HOSPITAL | Age: 63
End: 2023-09-07
Payer: COMMERCIAL

## 2024-03-08 ENCOUNTER — TELEPHONE (OUTPATIENT)
Dept: FAMILY MEDICINE | Facility: CLINIC | Age: 64
End: 2024-03-08
Payer: COMMERCIAL

## 2024-03-08 DIAGNOSIS — Z00.00 ANNUAL PHYSICAL EXAM: Primary | ICD-10-CM

## 2024-03-08 DIAGNOSIS — E06.3 AUTOIMMUNE THYROIDITIS: ICD-10-CM

## 2024-03-08 DIAGNOSIS — E03.9 HYPOTHYROIDISM, UNSPECIFIED TYPE: ICD-10-CM

## 2024-03-08 DIAGNOSIS — E55.9 VITAMIN D DEFICIENCY: ICD-10-CM

## 2024-03-08 DIAGNOSIS — E78.5 HYPERLIPIDEMIA, UNSPECIFIED HYPERLIPIDEMIA TYPE: ICD-10-CM

## 2024-03-08 NOTE — TELEPHONE ENCOUNTER
----- Message from Ginger Her sent at 3/8/2024  9:46 AM CST -----  .Type:  Patient Returning Call    Who Called:pt  Who Left Message for Patient:  Does the patient know what this is regarding?:labs put in   Would the patient rather a call back or a response via [facility]? Call back   Best Call Back Number:[phone]  Additional Information: pt wants to do labs on Monday and needs labs put in. She states that she is going to [facility] to have them done

## 2024-03-12 ENCOUNTER — LAB VISIT (OUTPATIENT)
Dept: LAB | Facility: HOSPITAL | Age: 64
End: 2024-03-12
Attending: STUDENT IN AN ORGANIZED HEALTH CARE EDUCATION/TRAINING PROGRAM
Payer: COMMERCIAL

## 2024-03-12 DIAGNOSIS — E03.9 HYPOTHYROIDISM, UNSPECIFIED TYPE: ICD-10-CM

## 2024-03-12 DIAGNOSIS — E06.3 AUTOIMMUNE THYROIDITIS: ICD-10-CM

## 2024-03-12 DIAGNOSIS — Z00.00 ANNUAL PHYSICAL EXAM: ICD-10-CM

## 2024-03-12 DIAGNOSIS — E55.9 VITAMIN D DEFICIENCY: ICD-10-CM

## 2024-03-12 DIAGNOSIS — E78.5 HYPERLIPIDEMIA, UNSPECIFIED HYPERLIPIDEMIA TYPE: ICD-10-CM

## 2024-03-12 LAB
ANION GAP SERPL CALC-SCNC: 11 MEQ/L
APPEARANCE UR: CLEAR
BACTERIA #/AREA URNS AUTO: NORMAL /HPF
BILIRUB UR QL STRIP.AUTO: NEGATIVE
BUN SERPL-MCNC: 10.2 MG/DL (ref 9.8–20.1)
CALCIUM SERPL-MCNC: 9.6 MG/DL (ref 8.4–10.2)
CHLORIDE SERPL-SCNC: 105 MMOL/L (ref 98–107)
CHOLEST SERPL-MCNC: 197 MG/DL
CHOLEST/HDLC SERPL: 3 {RATIO} (ref 0–5)
CO2 SERPL-SCNC: 24 MMOL/L (ref 23–31)
COLOR UR AUTO: NORMAL
CREAT SERPL-MCNC: 0.8 MG/DL (ref 0.55–1.02)
CREAT/UREA NIT SERPL: 13
DEPRECATED CALCIDIOL+CALCIFEROL SERPL-MC: 36.3 NG/ML (ref 30–80)
GFR SERPLBLD CREATININE-BSD FMLA CKD-EPI: >60 MLS/MIN/1.73/M2
GLUCOSE SERPL-MCNC: 108 MG/DL (ref 82–115)
GLUCOSE UR QL STRIP.AUTO: NORMAL
HDLC SERPL-MCNC: 65 MG/DL (ref 35–60)
HYALINE CASTS #/AREA URNS LPF: NORMAL /LPF
KETONES UR QL STRIP.AUTO: NEGATIVE
LDLC SERPL CALC-MCNC: 118 MG/DL (ref 50–140)
LEUKOCYTE ESTERASE UR QL STRIP.AUTO: NEGATIVE
NITRITE UR QL STRIP.AUTO: NEGATIVE
PH UR STRIP.AUTO: 5 [PH]
POTASSIUM SERPL-SCNC: 3.6 MMOL/L (ref 3.5–5.1)
PROT UR QL STRIP.AUTO: NEGATIVE
RBC #/AREA URNS AUTO: NORMAL /HPF
RBC UR QL AUTO: NEGATIVE
SODIUM SERPL-SCNC: 140 MMOL/L (ref 136–145)
SP GR UR STRIP.AUTO: 1 (ref 1–1.03)
SQUAMOUS #/AREA URNS LPF: NORMAL /HPF
TRIGL SERPL-MCNC: 70 MG/DL (ref 37–140)
TSH SERPL-ACNC: 2.4 UIU/ML (ref 0.35–4.94)
UROBILINOGEN UR STRIP-ACNC: NORMAL
VLDLC SERPL CALC-MCNC: 14 MG/DL
WBC #/AREA URNS AUTO: NORMAL /HPF

## 2024-03-12 PROCEDURE — 36415 COLL VENOUS BLD VENIPUNCTURE: CPT

## 2024-03-12 PROCEDURE — 81001 URINALYSIS AUTO W/SCOPE: CPT

## 2024-03-12 PROCEDURE — 80048 BASIC METABOLIC PNL TOTAL CA: CPT

## 2024-03-12 PROCEDURE — 82306 VITAMIN D 25 HYDROXY: CPT

## 2024-03-12 PROCEDURE — 84443 ASSAY THYROID STIM HORMONE: CPT

## 2024-03-12 PROCEDURE — 80061 LIPID PANEL: CPT

## 2024-03-20 ENCOUNTER — OFFICE VISIT (OUTPATIENT)
Dept: FAMILY MEDICINE | Facility: CLINIC | Age: 64
End: 2024-03-20
Payer: COMMERCIAL

## 2024-03-20 VITALS
SYSTOLIC BLOOD PRESSURE: 116 MMHG | HEART RATE: 60 BPM | DIASTOLIC BLOOD PRESSURE: 75 MMHG | WEIGHT: 128.5 LBS | TEMPERATURE: 97 F | OXYGEN SATURATION: 99 % | HEIGHT: 63 IN | RESPIRATION RATE: 16 BRPM | BODY MASS INDEX: 22.77 KG/M2

## 2024-03-20 DIAGNOSIS — E53.8 COBALAMIN DEFICIENCY: ICD-10-CM

## 2024-03-20 DIAGNOSIS — E78.5 HYPERLIPIDEMIA, UNSPECIFIED HYPERLIPIDEMIA TYPE: ICD-10-CM

## 2024-03-20 DIAGNOSIS — L50.8 CHRONIC URTICARIA: ICD-10-CM

## 2024-03-20 DIAGNOSIS — E06.3 AUTOIMMUNE THYROIDITIS: ICD-10-CM

## 2024-03-20 DIAGNOSIS — H47.099 DISORDER OF OPTIC NERVE, UNSPECIFIED LATERALITY: ICD-10-CM

## 2024-03-20 DIAGNOSIS — E03.9 HYPOTHYROIDISM, UNSPECIFIED TYPE: ICD-10-CM

## 2024-03-20 DIAGNOSIS — G43.009 MIGRAINE WITHOUT AURA AND RESPONSIVE TO TREATMENT: ICD-10-CM

## 2024-03-20 DIAGNOSIS — J30.9 ALLERGIC RHINITIS, UNSPECIFIED SEASONALITY, UNSPECIFIED TRIGGER: ICD-10-CM

## 2024-03-20 DIAGNOSIS — R76.8 POSITIVE ANTINUCLEAR ANTIBODY: ICD-10-CM

## 2024-03-20 DIAGNOSIS — Z00.00 ANNUAL PHYSICAL EXAM: Primary | ICD-10-CM

## 2024-03-20 DIAGNOSIS — E55.9 VITAMIN D DEFICIENCY: ICD-10-CM

## 2024-03-20 PROCEDURE — 1159F MED LIST DOCD IN RCRD: CPT | Mod: CPTII,,, | Performed by: STUDENT IN AN ORGANIZED HEALTH CARE EDUCATION/TRAINING PROGRAM

## 2024-03-20 PROCEDURE — 3008F BODY MASS INDEX DOCD: CPT | Mod: CPTII,,, | Performed by: STUDENT IN AN ORGANIZED HEALTH CARE EDUCATION/TRAINING PROGRAM

## 2024-03-20 PROCEDURE — 3074F SYST BP LT 130 MM HG: CPT | Mod: CPTII,,, | Performed by: STUDENT IN AN ORGANIZED HEALTH CARE EDUCATION/TRAINING PROGRAM

## 2024-03-20 PROCEDURE — 3078F DIAST BP <80 MM HG: CPT | Mod: CPTII,,, | Performed by: STUDENT IN AN ORGANIZED HEALTH CARE EDUCATION/TRAINING PROGRAM

## 2024-03-20 PROCEDURE — 99396 PREV VISIT EST AGE 40-64: CPT | Mod: ,,, | Performed by: STUDENT IN AN ORGANIZED HEALTH CARE EDUCATION/TRAINING PROGRAM

## 2024-03-20 RX ORDER — ASCORBIC ACID 500 MG
500 TABLET ORAL DAILY
COMMUNITY

## 2024-03-20 RX ORDER — LEVOTHYROXINE SODIUM 88 UG/1
88 TABLET ORAL DAILY
Qty: 90 TABLET | Refills: 3 | Status: SHIPPED | OUTPATIENT
Start: 2024-03-20

## 2024-03-20 NOTE — PROGRESS NOTES
Subjective:      Patient ID: Prisca Saenz is a 63 y.o.  female.    Chief Complaint: Wellness    Preventative Health: Patient says she will get her second dose of shingles at [facility]. BMP negative. Lipid panel overall negative. Vitamin D level negative. TSH negative. U/A negative. Mammogram negative from 08/23/23. Cologuard negative from 03/15/22.    Hypothyroidism/Autoimmune Thyroiditis: TSH negative. Patient following with Dr. Win Ssoa with Endocrinology, but states she is stable; therefore, she has no future follow-ups scheduled. Patient states compliance with Synthroid daily.    Migraines/B12 Deficiency/Optic Nerve Disorder/Vitamin D Deficiency: Vitamin D level negative. Patient following with Dr. Nano Morales with Neurology, but states she is stable; therefore, she has no future follow-ups scheduled. Patient taking Magnesium and B12 for her migraines. Patient following with Ophthalmology, but states she is stable; therefore, she has no future follow-ups scheduled. She says her current regimen keeps her from having migraines. Patient is taking a multi-vitamin that has vitamin D in it.    Allergic Rhinitis/Chronic Urticaria/CASTRO Positive/Autoimmune Thyroiditis: Patient following with Dr. Jairo Hutchins with Allergy-Immunology, but states she is stable; therefore, she has no future follow-ups scheduled. Patient following with Dr. Roe Mills with Rheumatology, but states she is stable; therefore, she has no future follow-ups scheduled. Patient states she's changed her diet and this has helped.     Review of Systems   Constitutional:  Negative for activity change, appetite change, chills, diaphoresis, fatigue, fever and unexpected weight change.   Eyes:  Negative for pain and visual disturbance.   Respiratory:  Negative for apnea, cough, shortness of breath, wheezing and stridor.    Cardiovascular:  Negative for chest pain, palpitations and leg swelling.   Gastrointestinal:  Negative for abdominal  "pain, blood in stool, constipation, diarrhea, nausea and vomiting.   Genitourinary:  Negative for dysuria and hematuria.   Musculoskeletal:  Positive for arthralgias. Negative for back pain, joint swelling and myalgias.   Skin:  Negative for rash and wound.   Allergic/Immunologic: Positive for environmental allergies.   Neurological:  Negative for syncope, weakness and headaches.   Psychiatric/Behavioral:  Negative for behavioral problems, dysphoric mood and sleep disturbance. The patient is not nervous/anxious.      Objective:   /75 (BP Location: Right arm)   Pulse 60   Temp 97.3 °F (36.3 °C) (Temporal)   Resp 16   Ht 5' 3" (1.6 m)   Wt 58.3 kg (128 lb 8 oz)   SpO2 99%   BMI 22.76 kg/m²     Physical Exam  Vitals and nursing note reviewed.   Constitutional:       General: She is not in acute distress.     Appearance: Normal appearance. She is not ill-appearing or toxic-appearing.   HENT:      Head: Normocephalic and atraumatic.      Mouth/Throat:      Mouth: Mucous membranes are moist.      Pharynx: Oropharynx is clear.   Eyes:      Conjunctiva/sclera: Conjunctivae normal.   Cardiovascular:      Rate and Rhythm: Normal rate and regular rhythm.      Heart sounds: Normal heart sounds. No murmur heard.  Pulmonary:      Effort: Pulmonary effort is normal. No respiratory distress.      Breath sounds: Normal breath sounds.   Abdominal:      General: There is no distension.      Palpations: Abdomen is soft.      Tenderness: There is no abdominal tenderness.   Musculoskeletal:         General: No swelling. Normal range of motion.      Cervical back: Neck supple. No tenderness.      Right lower leg: No edema.      Left lower leg: No edema.   Lymphadenopathy:      Cervical: No cervical adenopathy.   Skin:     General: Skin is warm and dry.      Findings: No lesion or rash.   Neurological:      General: No focal deficit present.      Mental Status: She is alert. Mental status is at baseline.      Motor: No " weakness.      Coordination: Coordination normal.   Psychiatric:         Mood and Affect: Mood normal.         Behavior: Behavior normal.         Thought Content: Thought content normal.         Judgment: Judgment normal.       Assessment/Plan:   1. Annual physical exam  Comments:  - Health maintenance reviewed.  - Lab results reviewed with patient.    2. Hyperlipidemia, unspecified hyperlipidemia type  Assessment & Plan:  - Lipid panel overall negative.      3. Hypothyroidism, unspecified type  Assessment & Plan:  - Patient following with Dr. Win Sosa with Endocrinology, but states she is stable; therefore, she has no future follow-ups scheduled.  - TSH negative.  - Continue Synthroid 88mcg daily.    Orders:  -     levothyroxine (SYNTHROID) 88 MCG tablet; Take 1 tablet (88 mcg total) by mouth once daily.  Dispense: 90 tablet; Refill: 3    4. Autoimmune thyroiditis  Assessment & Plan:  - Refer to Hypothyroidism plan.        Orders:  -     levothyroxine (SYNTHROID) 88 MCG tablet; Take 1 tablet (88 mcg total) by mouth once daily.  Dispense: 90 tablet; Refill: 3    5. Migraine without aura and responsive to treatment  Assessment & Plan:  - Patient following with Dr. Nano Morales with Neurology, but states she is stable; therefore, she has no future follow-ups scheduled.  - Continue magnesium oxide 500mg daily and B12 100mcg daily.        6. Cobalamin deficiency  Assessment & Plan:  - Continue B12 supplementation.        7. Disorder of optic nerve, unspecified laterality  Assessment & Plan:  - Patient following with Ophthalmology, but states she is stable; therefore, she has no future follow-ups scheduled.      8. Vitamin D deficiency  Assessment & Plan:  - Vitamin D level negative.  - Patient is taking a multi-vitamin that has vitamin D in it.      9. Allergic rhinitis, unspecified seasonality, unspecified trigger  Assessment & Plan:  -  Patient following with Dr. Jairo Hutchins with Allergy-Immunology, but states  she is stable; therefore, she has no future follow-ups scheduled.        10. Chronic urticaria  Assessment & Plan:  - Refer to Allergic rhinitis plan.        11. Positive antinuclear antibody  Assessment & Plan:  - Patient following with Dr. Roe Mills with Rheumatology, but states she is stable; therefore, she has no future follow-ups scheduled.            Follow up in about 1 year (around 3/20/2025) for Wellness.

## 2024-06-26 ENCOUNTER — TELEPHONE (OUTPATIENT)
Dept: FAMILY MEDICINE | Facility: CLINIC | Age: 64
End: 2024-06-26
Payer: COMMERCIAL

## 2024-06-26 DIAGNOSIS — Z12.31 ENCOUNTER FOR SCREENING MAMMOGRAM FOR MALIGNANT NEOPLASM OF BREAST: Primary | ICD-10-CM

## 2024-06-26 NOTE — TELEPHONE ENCOUNTER
----- Message from Patricia Wilson sent at 6/26/2024  3:00 PM CDT -----  Who Called: Prisca Saenz    Caller is requesting to schedule their annual mammogram appointment. Order is not listed in Epic. Please enter order and contact patient to schedule.    Where would they [facility] the mammogram [facility]    Preferred Method of Contact: Phone Call  Patient's Preferred Phone Number on File: [phone]   Best Call Back Number, if different:  Additional Information:   mammogram order request, pt called to have orders sent to facility above, please advise, thanks

## 2024-06-26 NOTE — TELEPHONE ENCOUNTER
I have ordered the following. Please notify the patient.    Orders Placed This Encounter   Procedures    Mammo Digital Screening Bilat w/ Ronnie     Standing Status:   Future     Standing Expiration Date:   6/26/2026     Scheduling Instructions:      [facility]     Order Specific Question:   May the Radiologist modify the order per protocol to meet the clinical needs of the patient?     Answer:   Yes     Order Specific Question:   Release to patient     Answer:   Immediate

## 2024-07-01 ENCOUNTER — TELEPHONE (OUTPATIENT)
Dept: FAMILY MEDICINE | Facility: CLINIC | Age: 64
End: 2024-07-01
Payer: COMMERCIAL

## 2024-07-01 NOTE — TELEPHONE ENCOUNTER
----- Message from Kim Almazan sent at 7/1/2024  9:14 AM CDT -----  Regarding: mammogram  Who Called: Prisca Saenz    Caller is requesting to schedule their annual mammogram appointment. Order is not listed in Epic. Please enter order and contact patient to schedule.    Where would they like the mammogram performed? [facility]    Preferred Method of Contact: Phone Call  Patient's Preferred Phone Number on File: [phone]   Best Call Back Number, if different:  Additional Information: Pt is requesting that her orders for her upcoming mammogram be sent over to the [facility]; please advise.

## 2024-07-01 NOTE — TELEPHONE ENCOUNTER
Attempted to call patient to clarify orders request no answer, VM left instructing patient to call our office.

## 2024-07-01 NOTE — TELEPHONE ENCOUNTER
Orders have been faxed to [facility] per patient request.     No further questions or actions needed at this time.

## 2024-07-01 NOTE — TELEPHONE ENCOUNTER
----- Message from Maureen Love sent at 7/1/2024  9:58 AM CDT -----  Regarding: returned call  Who Called: Prisca Saenz    Patient is returning phone call    Who Left Message for Patient:aNncy  Does the patient know what this is regarding?:mammo orders    Patient's Preferred Phone Number on File: [phone]     Additional Information: stated that she received a call about orders. Please advise

## 2024-08-26 ENCOUNTER — HOSPITAL ENCOUNTER (OUTPATIENT)
Dept: RADIOLOGY | Facility: HOSPITAL | Age: 64
Discharge: HOME | End: 2024-08-26
Attending: STUDENT IN AN ORGANIZED HEALTH CARE EDUCATION/TRAINING PROGRAM
Payer: COMMERCIAL

## 2024-08-26 DIAGNOSIS — Z12.31 ENCOUNTER FOR SCREENING MAMMOGRAM FOR MALIGNANT NEOPLASM OF BREAST: ICD-10-CM

## 2024-08-26 PROCEDURE — 77063 BREAST TOMOSYNTHESIS BI: CPT | Mod: 26,,, | Performed by: RADIOLOGY

## 2024-08-26 PROCEDURE — 77067 SCR MAMMO BI INCL CAD: CPT | Mod: TC

## 2024-08-26 PROCEDURE — 77067 SCR MAMMO BI INCL CAD: CPT | Mod: 26,,, | Performed by: RADIOLOGY

## 2024-08-26 PROCEDURE — 77063 BREAST TOMOSYNTHESIS BI: CPT | Mod: TC

## 2025-03-11 ENCOUNTER — TELEPHONE (OUTPATIENT)
Dept: FAMILY MEDICINE | Facility: CLINIC | Age: 65
End: 2025-03-11
Payer: COMMERCIAL

## 2025-03-11 DIAGNOSIS — E78.5 HYPERLIPIDEMIA, UNSPECIFIED HYPERLIPIDEMIA TYPE: ICD-10-CM

## 2025-03-11 DIAGNOSIS — Z00.00 ANNUAL PHYSICAL EXAM: Primary | ICD-10-CM

## 2025-03-11 DIAGNOSIS — E53.8 COBALAMIN DEFICIENCY: ICD-10-CM

## 2025-03-11 DIAGNOSIS — E06.3 AUTOIMMUNE THYROIDITIS: ICD-10-CM

## 2025-03-11 DIAGNOSIS — Z13.1 DIABETES MELLITUS SCREENING: ICD-10-CM

## 2025-03-11 DIAGNOSIS — E03.9 HYPOTHYROIDISM, UNSPECIFIED TYPE: ICD-10-CM

## 2025-03-11 DIAGNOSIS — E55.9 VITAMIN D DEFICIENCY: ICD-10-CM

## 2025-03-11 NOTE — TELEPHONE ENCOUNTER
I have ordered the following labs. Please notify the patient.    Orders Placed This Encounter   Procedures    CBC Auto Differential     Standing Status:   Future     Expected Date:   3/11/2025     Expiration Date:   6/11/2025    Comprehensive Metabolic Panel     Standing Status:   Future     Expected Date:   3/11/2025     Expiration Date:   6/11/2025    Lipid Panel     Standing Status:   Future     Expected Date:   3/11/2025     Expiration Date:   6/11/2025    TSH     Standing Status:   Future     Expected Date:   3/11/2025     Expiration Date:   6/11/2025    Hemoglobin A1C     Standing Status:   Future     Expected Date:   3/11/2025     Expiration Date:   6/11/2025    Vitamin D     Standing Status:   Future     Expected Date:   3/11/2025     Expiration Date:   6/11/2025    Vitamin B12     Standing Status:   Future     Expected Date:   3/11/2025     Expiration Date:   5/10/2026

## 2025-03-11 NOTE — TELEPHONE ENCOUNTER
----- Message from Med Assistant Acosta sent at 3/10/2025  5:17 PM CDT -----  Regarding: Labs for wellness visit.  Please advise.  ----- Message -----  From: Betzaida Medina  Sent: 3/10/2025   8:24 AM CDT  To: [facility]    Who Called: Prisca Mahan is requesting assistance/information from provider's office.Symptoms (please be specific): n/a How long has patient had these symptoms:  n/aList of preferred pharmacies on file (remove unneeded): Preferred Method of Contact: Phone CallPatient's Preferred Phone Number on File: [phone] Best Call Back Number, if different:Additional Information: pt needs to know if labs are due before her upcoming wellnessPlease advise pt

## 2025-03-14 ENCOUNTER — LAB VISIT (OUTPATIENT)
Dept: LAB | Facility: HOSPITAL | Age: 65
End: 2025-03-14
Attending: STUDENT IN AN ORGANIZED HEALTH CARE EDUCATION/TRAINING PROGRAM
Payer: COMMERCIAL

## 2025-03-14 DIAGNOSIS — Z13.1 DIABETES MELLITUS SCREENING: ICD-10-CM

## 2025-03-14 DIAGNOSIS — Z00.00 ANNUAL PHYSICAL EXAM: ICD-10-CM

## 2025-03-14 DIAGNOSIS — E53.8 COBALAMIN DEFICIENCY: ICD-10-CM

## 2025-03-14 DIAGNOSIS — E03.9 HYPOTHYROIDISM, UNSPECIFIED TYPE: ICD-10-CM

## 2025-03-14 DIAGNOSIS — E78.5 HYPERLIPIDEMIA, UNSPECIFIED HYPERLIPIDEMIA TYPE: ICD-10-CM

## 2025-03-14 DIAGNOSIS — E55.9 VITAMIN D DEFICIENCY: ICD-10-CM

## 2025-03-14 DIAGNOSIS — E06.3 AUTOIMMUNE THYROIDITIS: ICD-10-CM

## 2025-03-14 LAB
25(OH)D3+25(OH)D2 SERPL-MCNC: 37 NG/ML (ref 30–80)
ALBUMIN SERPL-MCNC: 3.8 G/DL (ref 3.4–4.8)
ALBUMIN/GLOB SERPL: 1.1 RATIO (ref 1.1–2)
ALP SERPL-CCNC: 70 UNIT/L (ref 40–150)
ALT SERPL-CCNC: 19 UNIT/L (ref 0–55)
ANION GAP SERPL CALC-SCNC: 8 MEQ/L
AST SERPL-CCNC: 22 UNIT/L (ref 5–34)
BASOPHILS # BLD AUTO: 0.06 X10(3)/MCL
BASOPHILS NFR BLD AUTO: 1 %
BILIRUB SERPL-MCNC: 0.3 MG/DL
BUN SERPL-MCNC: 17.6 MG/DL (ref 9.8–20.1)
CALCIUM SERPL-MCNC: 9.1 MG/DL (ref 8.4–10.2)
CHLORIDE SERPL-SCNC: 105 MMOL/L (ref 98–107)
CHOLEST SERPL-MCNC: 191 MG/DL
CHOLEST/HDLC SERPL: 3 {RATIO} (ref 0–5)
CO2 SERPL-SCNC: 26 MMOL/L (ref 23–31)
CREAT SERPL-MCNC: 0.72 MG/DL (ref 0.55–1.02)
CREAT/UREA NIT SERPL: 24
EOSINOPHIL # BLD AUTO: 0.16 X10(3)/MCL (ref 0–0.9)
EOSINOPHIL NFR BLD AUTO: 2.8 %
ERYTHROCYTE [DISTWIDTH] IN BLOOD BY AUTOMATED COUNT: 12.2 % (ref 11.5–17)
EST. AVERAGE GLUCOSE BLD GHB EST-MCNC: 96.8 MG/DL
GFR SERPLBLD CREATININE-BSD FMLA CKD-EPI: >60 ML/MIN/1.73/M2
GLOBULIN SER-MCNC: 3.6 GM/DL (ref 2.4–3.5)
GLUCOSE SERPL-MCNC: 110 MG/DL (ref 82–115)
HBA1C MFR BLD: 5 %
HCT VFR BLD AUTO: 38.4 % (ref 37–47)
HDLC SERPL-MCNC: 59 MG/DL (ref 35–60)
HGB BLD-MCNC: 12 G/DL (ref 12–16)
IMM GRANULOCYTES # BLD AUTO: 0.02 X10(3)/MCL (ref 0–0.04)
IMM GRANULOCYTES NFR BLD AUTO: 0.3 %
LDLC SERPL CALC-MCNC: 115 MG/DL (ref 50–140)
LYMPHOCYTES # BLD AUTO: 1.7 X10(3)/MCL (ref 0.6–4.6)
LYMPHOCYTES NFR BLD AUTO: 29.6 %
MCH RBC QN AUTO: 28.8 PG (ref 27–31)
MCHC RBC AUTO-ENTMCNC: 31.3 G/DL (ref 33–36)
MCV RBC AUTO: 92.1 FL (ref 80–94)
MONOCYTES # BLD AUTO: 0.53 X10(3)/MCL (ref 0.1–1.3)
MONOCYTES NFR BLD AUTO: 9.2 %
NEUTROPHILS # BLD AUTO: 3.27 X10(3)/MCL (ref 2.1–9.2)
NEUTROPHILS NFR BLD AUTO: 57.1 %
NRBC BLD AUTO-RTO: 0 %
PLATELET # BLD AUTO: 196 X10(3)/MCL (ref 130–400)
PMV BLD AUTO: 12.5 FL (ref 7.4–10.4)
POTASSIUM SERPL-SCNC: 4 MMOL/L (ref 3.5–5.1)
PROT SERPL-MCNC: 7.4 GM/DL (ref 5.8–7.6)
RBC # BLD AUTO: 4.17 X10(6)/MCL (ref 4.2–5.4)
SODIUM SERPL-SCNC: 139 MMOL/L (ref 136–145)
TRIGL SERPL-MCNC: 84 MG/DL (ref 37–140)
TSH SERPL-ACNC: 1.53 UIU/ML (ref 0.35–4.94)
VIT B12 SERPL-MCNC: 365 PG/ML (ref 213–816)
VLDLC SERPL CALC-MCNC: 17 MG/DL
WBC # BLD AUTO: 5.74 X10(3)/MCL (ref 4.5–11.5)

## 2025-03-14 PROCEDURE — 84443 ASSAY THYROID STIM HORMONE: CPT

## 2025-03-14 PROCEDURE — 36415 COLL VENOUS BLD VENIPUNCTURE: CPT

## 2025-03-14 PROCEDURE — 82306 VITAMIN D 25 HYDROXY: CPT

## 2025-03-14 PROCEDURE — 80061 LIPID PANEL: CPT

## 2025-03-14 PROCEDURE — 83036 HEMOGLOBIN GLYCOSYLATED A1C: CPT

## 2025-03-14 PROCEDURE — 85025 COMPLETE CBC W/AUTO DIFF WBC: CPT

## 2025-03-14 PROCEDURE — 80053 COMPREHEN METABOLIC PANEL: CPT

## 2025-03-14 PROCEDURE — 82607 VITAMIN B-12: CPT

## 2025-03-21 ENCOUNTER — OFFICE VISIT (OUTPATIENT)
Dept: FAMILY MEDICINE | Facility: CLINIC | Age: 65
End: 2025-03-21
Payer: COMMERCIAL

## 2025-03-21 VITALS
HEIGHT: 63 IN | RESPIRATION RATE: 18 BRPM | TEMPERATURE: 98 F | BODY MASS INDEX: 21.91 KG/M2 | HEART RATE: 58 BPM | SYSTOLIC BLOOD PRESSURE: 124 MMHG | OXYGEN SATURATION: 99 % | DIASTOLIC BLOOD PRESSURE: 78 MMHG | WEIGHT: 123.69 LBS

## 2025-03-21 DIAGNOSIS — E03.9 HYPOTHYROIDISM, UNSPECIFIED TYPE: ICD-10-CM

## 2025-03-21 DIAGNOSIS — E53.8 COBALAMIN DEFICIENCY: ICD-10-CM

## 2025-03-21 DIAGNOSIS — E78.5 HYPERLIPIDEMIA, UNSPECIFIED HYPERLIPIDEMIA TYPE: ICD-10-CM

## 2025-03-21 DIAGNOSIS — R76.8 POSITIVE ANTINUCLEAR ANTIBODY: ICD-10-CM

## 2025-03-21 DIAGNOSIS — J30.9 ALLERGIC RHINITIS, UNSPECIFIED SEASONALITY, UNSPECIFIED TRIGGER: ICD-10-CM

## 2025-03-21 DIAGNOSIS — E55.9 VITAMIN D DEFICIENCY: ICD-10-CM

## 2025-03-21 DIAGNOSIS — Z00.00 ANNUAL PHYSICAL EXAM: Primary | ICD-10-CM

## 2025-03-21 DIAGNOSIS — E06.3 AUTOIMMUNE THYROIDITIS: ICD-10-CM

## 2025-03-21 DIAGNOSIS — G43.009 MIGRAINE WITHOUT AURA AND RESPONSIVE TO TREATMENT: ICD-10-CM

## 2025-03-21 DIAGNOSIS — H47.099 DISORDER OF OPTIC NERVE, UNSPECIFIED LATERALITY: ICD-10-CM

## 2025-03-21 DIAGNOSIS — L50.8 CHRONIC URTICARIA: ICD-10-CM

## 2025-03-21 DIAGNOSIS — Z12.11 COLON CANCER SCREENING: Primary | ICD-10-CM

## 2025-03-21 RX ORDER — LEVOTHYROXINE SODIUM 88 UG/1
88 TABLET ORAL DAILY
Qty: 90 TABLET | Refills: 3 | Status: SHIPPED | OUTPATIENT
Start: 2025-03-21

## 2025-03-21 NOTE — PROGRESS NOTES
Subjective:      Patient ID: Prisca Saenz is a 64 y.o.  female.    Chief Complaint: Wellness    Preventative Health: CBC w/diff overall negative. Vitamin B12 level negative. CMP overall negative. Lipid panel negative. Vitamin D level negative. A1c negative. TSH negative. Mammogram negative from 08/26/24. Patient amenable to Cologuard order.     Hypothyroidism/Autoimmune Thyroiditis: TSH negative. Patient was following with Dr. Win Sosa with Endocrinology, but states she is stable; therefore, she has no future follow-ups scheduled. Patient states compliance with Synthroid daily.    Migraines/B12 Deficiency/Optic Nerve Disorder/Vitamin D Deficiency: Vitamin D level negative. Patient was following with Dr. Nano Morales with Neurology, but states she is stable; therefore, she has no future follow-ups scheduled. Patient taking Magnesium and B12 for her migraines. Patient following with Ophthalmology, but states she is stable; therefore, she has no future follow-ups scheduled. She says her current regimen keeps her from having migraines. Patient is taking a multi-vitamin that has vitamin D in it.    Allergic Rhinitis/Chronic Urticaria/CASTRO Positive/Autoimmune Thyroiditis: Patient was following with Dr. Jairo Hutchins with Allergy-Immunology, but states she is stable; therefore, she has no future follow-ups scheduled. Patient was following with Dr. Roe Mills with Rheumatology, but states she is stable; therefore, she has no future follow-ups scheduled. Patient states she's changed her diet and this has helped.      Review of Systems   Constitutional:  Positive for fatigue. Negative for activity change, appetite change, chills, diaphoresis, fever and unexpected weight change.   Eyes:  Negative for pain and visual disturbance.   Respiratory:  Negative for apnea, cough, shortness of breath, wheezing and stridor.    Cardiovascular:  Negative for chest pain, palpitations and leg swelling.   Gastrointestinal:  " Negative for abdominal pain, blood in stool, constipation, diarrhea, nausea and vomiting.   Genitourinary:  Negative for dysuria and hematuria.   Musculoskeletal:  Positive for arthralgias. Negative for back pain, joint swelling and myalgias.   Skin:  Negative for rash and wound.   Allergic/Immunologic: Positive for environmental allergies.   Neurological:  Negative for syncope, weakness and headaches.   Psychiatric/Behavioral:  Negative for behavioral problems, dysphoric mood and sleep disturbance. The patient is not nervous/anxious.      Objective:   /78 (BP Location: Right arm, Patient Position: Sitting)   Pulse (!) 58   Temp 98.1 °F (36.7 °C) (Oral)   Resp 18   Ht 5' 3" (1.6 m)   Wt 56.1 kg (123 lb 11.2 oz)   SpO2 99%   BMI 21.91 kg/m²     Physical Exam  Vitals and nursing note reviewed.   Constitutional:       General: She is not in acute distress.     Appearance: Normal appearance. She is not ill-appearing or toxic-appearing.   HENT:      Head: Normocephalic and atraumatic.      Mouth/Throat:      Mouth: Mucous membranes are moist.      Pharynx: Oropharynx is clear.   Eyes:      Conjunctiva/sclera: Conjunctivae normal.   Cardiovascular:      Rate and Rhythm: Normal rate and regular rhythm.      Heart sounds: Normal heart sounds. No murmur heard.  Pulmonary:      Effort: Pulmonary effort is normal. No respiratory distress.      Breath sounds: Normal breath sounds.   Abdominal:      General: There is no distension.      Palpations: Abdomen is soft.      Tenderness: There is no abdominal tenderness.   Musculoskeletal:         General: No swelling. Normal range of motion.      Cervical back: Neck supple. No tenderness.      Right lower leg: No edema.      Left lower leg: No edema.   Lymphadenopathy:      Cervical: No cervical adenopathy.   Skin:     General: Skin is warm and dry.      Findings: No lesion or rash.   Neurological:      General: No focal deficit present.      Mental Status: She is " alert. Mental status is at baseline.      Motor: No weakness.      Coordination: Coordination normal.   Psychiatric:         Mood and Affect: Mood normal.         Behavior: Behavior normal.         Thought Content: Thought content normal.         Judgment: Judgment normal.       Assessment/Plan:   1. Annual physical exam  Comments:  - Health maintenance reviewed.  - Lab results reviewed with patient.    2. Hypothyroidism, unspecified type  Assessment & Plan:  - Patient was following with Dr. Win Sosa with Endocrinology, but states she is stable; therefore, she has no future follow-ups scheduled.  - TSH negative.  - Continue Synthroid 88mcg daily.      3. Autoimmune thyroiditis  Assessment & Plan:  - Stable.  - Refer to Hypothyroidism plan.      4. Migraine without aura and responsive to treatment  Assessment & Plan:  - Stable.  - Patient was following with Dr. Nnao Morales with Neurology, but states she is stable; therefore, she has no future follow-ups scheduled.  - Continue magnesium oxide 500mg daily and B12 100mcg daily.      5. Cobalamin deficiency  Assessment & Plan:  - Vitamin B12 level negative.  - Continue B12 supplementation.      6. Disorder of optic nerve, unspecified laterality  Assessment & Plan:  - Stable.  - Patient was following with Ophthalmology, but states she is stable; therefore, she has no future follow-ups scheduled.      7. Vitamin D deficiency  Assessment & Plan:  - Vitamin D level negative.  - Patient is taking a multi-vitamin that has vitamin D in it.      8. Allergic rhinitis, unspecified seasonality, unspecified trigger  Assessment & Plan:  - Stable.  - Patient was following with Dr. Jairo Hutchins with Allergy-Immunology, but states she is stable; therefore, she has no future follow-ups scheduled.      9. Chronic urticaria  Assessment & Plan:  - Stable.  - Refer to Allergic rhinitis plan.      10. Positive antinuclear antibody  Assessment & Plan:  - Patient was following with   Roe Mills with Rheumatology, but states she is stable; therefore, she has no future follow-ups scheduled.       11. Hyperlipidemia, unspecified hyperlipidemia type  Assessment & Plan:  - Lipid panel overall negative.  - Patient counseled regarding lifestyle modifications with diet and exercise.         Follow up in about 1 year (around 3/21/2026) for Wellness.

## 2025-03-21 NOTE — ASSESSMENT & PLAN NOTE
- Lipid panel overall negative.  - Patient counseled regarding lifestyle modifications with diet and exercise.

## 2025-03-21 NOTE — ASSESSMENT & PLAN NOTE
- Stable.  - Patient was following with Ophthalmology, but states she is stable; therefore, she has no future follow-ups scheduled.

## 2025-03-21 NOTE — ASSESSMENT & PLAN NOTE
- Stable.  - Patient was following with Dr. Jairo Hutchins with Allergy-Immunology, but states she is stable; therefore, she has no future follow-ups scheduled.

## 2025-03-21 NOTE — ASSESSMENT & PLAN NOTE
- Patient was following with Dr. Roe Mills with Rheumatology, but states she is stable; therefore, she has no future follow-ups scheduled.

## 2025-04-09 ENCOUNTER — RESULTS FOLLOW-UP (OUTPATIENT)
Dept: FAMILY MEDICINE | Facility: CLINIC | Age: 65
End: 2025-04-09

## 2025-04-09 ENCOUNTER — TELEPHONE (OUTPATIENT)
Dept: FAMILY MEDICINE | Facility: CLINIC | Age: 65
End: 2025-04-09
Payer: COMMERCIAL

## 2025-04-09 NOTE — TELEPHONE ENCOUNTER
----- Message from Sabrina sent at 4/9/2025  2:14 PM CDT -----  .Who Called: Prisca MariewinPatient is returning phone callWho Left Message for Patient:Nevaeh the patient know what this is regarding?:naPreferred Method of Contact: Phone CallPatient's Preferred Phone Number on File: [phone] Best Call Back Number, if different:Additional Information: pt returning call

## 2025-06-19 ENCOUNTER — TELEPHONE (OUTPATIENT)
Dept: FAMILY MEDICINE | Facility: CLINIC | Age: 65
End: 2025-06-19
Payer: COMMERCIAL

## 2025-06-19 DIAGNOSIS — Z12.31 ENCOUNTER FOR SCREENING MAMMOGRAM FOR MALIGNANT NEOPLASM OF BREAST: Primary | ICD-10-CM

## 2025-06-19 NOTE — TELEPHONE ENCOUNTER
Copied from CRM #[id number]. Topic: General Inquiry - Patient Advice  >> Jun 19, 2025 11:04 AM Dorothy Taylor wrote:  Who Called: Prisca Mariewin    Caller is requesting to schedule their annual mammogram appointment. Order is not listed in Epic. Please enter order and contact patient to schedule.    Where would they like the mammogram performed? [facility]    Preferred Method of Contact: Phone Call  Patient's Preferred Phone Number on File: [phone]   Best Call Back Number, if different:  Additional Information:

## 2025-06-19 NOTE — TELEPHONE ENCOUNTER
Pt notified of mammogram order placed and order faxed to [facility].  Pt verbalized understanding.

## 2025-08-27 ENCOUNTER — HOSPITAL ENCOUNTER (OUTPATIENT)
Dept: RADIOLOGY | Facility: HOSPITAL | Age: 65
Discharge: HOME | End: 2025-08-27
Attending: STUDENT IN AN ORGANIZED HEALTH CARE EDUCATION/TRAINING PROGRAM
Payer: COMMERCIAL

## 2025-08-27 DIAGNOSIS — Z12.31 ENCOUNTER FOR SCREENING MAMMOGRAM FOR MALIGNANT NEOPLASM OF BREAST: ICD-10-CM

## 2025-08-27 PROCEDURE — 77067 SCR MAMMO BI INCL CAD: CPT | Mod: 26,,, | Performed by: RADIOLOGY

## 2025-08-27 PROCEDURE — 77063 BREAST TOMOSYNTHESIS BI: CPT | Mod: 26,,, | Performed by: RADIOLOGY

## 2025-08-27 PROCEDURE — 77063 BREAST TOMOSYNTHESIS BI: CPT | Mod: TC
